# Patient Record
Sex: MALE | Race: BLACK OR AFRICAN AMERICAN | Employment: UNEMPLOYED | URBAN - METROPOLITAN AREA
[De-identification: names, ages, dates, MRNs, and addresses within clinical notes are randomized per-mention and may not be internally consistent; named-entity substitution may affect disease eponyms.]

---

## 2017-01-04 RX ORDER — TADALAFIL 20 MG/1
TABLET ORAL
Qty: 8 TABLET | Refills: 1 | Status: SHIPPED | OUTPATIENT
Start: 2017-01-04 | End: 2017-06-01 | Stop reason: SDUPTHER

## 2017-02-27 RX ORDER — IBUPROFEN 800 MG/1
TABLET ORAL
Qty: 90 TABLET | Refills: 3 | Status: SHIPPED | OUTPATIENT
Start: 2017-02-27 | End: 2017-08-17 | Stop reason: SDUPTHER

## 2017-06-02 RX ORDER — TADALAFIL 20 MG/1
TABLET ORAL
Qty: 8 TABLET | Refills: 1 | Status: SHIPPED | OUTPATIENT
Start: 2017-06-02 | End: 2017-07-26 | Stop reason: SDUPTHER

## 2017-07-26 RX ORDER — TADALAFIL 20 MG/1
TABLET ORAL
Qty: 8 TABLET | Refills: 1 | Status: SHIPPED | OUTPATIENT
Start: 2017-07-26 | End: 2017-08-17 | Stop reason: SDUPTHER

## 2017-08-17 ENCOUNTER — OFFICE VISIT (OUTPATIENT)
Dept: INTERNAL MEDICINE | Age: 42
End: 2017-08-17

## 2017-08-17 VITALS
WEIGHT: 185 LBS | HEIGHT: 72 IN | OXYGEN SATURATION: 99 % | BODY MASS INDEX: 25.06 KG/M2 | HEART RATE: 57 BPM | SYSTOLIC BLOOD PRESSURE: 147 MMHG | DIASTOLIC BLOOD PRESSURE: 89 MMHG

## 2017-08-17 DIAGNOSIS — Z12.5 PROSTATE CANCER SCREENING: ICD-10-CM

## 2017-08-17 DIAGNOSIS — Z13.1 ENCOUNTER FOR SCREENING FOR DIABETES MELLITUS: ICD-10-CM

## 2017-08-17 DIAGNOSIS — Z23 FLU VACCINE NEED: Primary | ICD-10-CM

## 2017-08-17 DIAGNOSIS — M51.9 LUMBAR DISC DISEASE: ICD-10-CM

## 2017-08-17 DIAGNOSIS — Z11.4 SCREENING FOR HIV WITHOUT PRESENCE OF RISK FACTORS: ICD-10-CM

## 2017-08-17 DIAGNOSIS — Z00.00 WELL ADULT EXAM: ICD-10-CM

## 2017-08-17 PROCEDURE — 90688 IIV4 VACCINE SPLT 0.5 ML IM: CPT | Performed by: INTERNAL MEDICINE

## 2017-08-17 PROCEDURE — 90471 IMMUNIZATION ADMIN: CPT | Performed by: INTERNAL MEDICINE

## 2017-08-17 PROCEDURE — 99214 OFFICE O/P EST MOD 30 MIN: CPT | Performed by: INTERNAL MEDICINE

## 2017-08-17 RX ORDER — TRAMADOL HYDROCHLORIDE 50 MG/1
50 TABLET ORAL EVERY 6 HOURS PRN
COMMUNITY

## 2017-08-17 RX ORDER — TADALAFIL 20 MG/1
TABLET ORAL
Qty: 8 TABLET | Refills: 1 | Status: SHIPPED | OUTPATIENT
Start: 2017-08-17 | End: 2018-02-03 | Stop reason: SDUPTHER

## 2017-08-17 RX ORDER — IBUPROFEN 800 MG/1
800 TABLET ORAL EVERY 8 HOURS PRN
Qty: 90 TABLET | Refills: 5 | Status: SHIPPED | OUTPATIENT
Start: 2017-08-17 | End: 2021-06-22

## 2017-08-17 RX ORDER — TRAMADOL HYDROCHLORIDE 50 MG/1
50 TABLET ORAL EVERY 6 HOURS PRN
Status: CANCELLED | OUTPATIENT
Start: 2017-08-17

## 2017-08-17 ASSESSMENT — ENCOUNTER SYMPTOMS
GASTROINTESTINAL NEGATIVE: 1
BACK PAIN: 1

## 2017-08-17 ASSESSMENT — PATIENT HEALTH QUESTIONNAIRE - PHQ9
SUM OF ALL RESPONSES TO PHQ QUESTIONS 1-9: 0
SUM OF ALL RESPONSES TO PHQ9 QUESTIONS 1 & 2: 0
2. FEELING DOWN, DEPRESSED OR HOPELESS: 0
1. LITTLE INTEREST OR PLEASURE IN DOING THINGS: 0

## 2018-02-05 RX ORDER — TADALAFIL 20 MG
TABLET ORAL
Qty: 8 TABLET | Refills: 2 | Status: SHIPPED | OUTPATIENT
Start: 2018-02-05 | End: 2018-12-13 | Stop reason: SDUPTHER

## 2018-09-26 PROBLEM — Z00.00 WELL ADULT EXAM: Status: RESOLVED | Noted: 2017-08-17 | Resolved: 2018-09-26

## 2018-12-13 ENCOUNTER — OFFICE VISIT (OUTPATIENT)
Dept: PRIMARY CARE CLINIC | Age: 43
End: 2018-12-13
Payer: COMMERCIAL

## 2018-12-13 VITALS
BODY MASS INDEX: 26.41 KG/M2 | TEMPERATURE: 97.9 F | HEART RATE: 55 BPM | DIASTOLIC BLOOD PRESSURE: 86 MMHG | HEIGHT: 72 IN | WEIGHT: 195 LBS | OXYGEN SATURATION: 100 % | SYSTOLIC BLOOD PRESSURE: 132 MMHG

## 2018-12-13 DIAGNOSIS — M54.9 ACUTE LEFT-SIDED BACK PAIN, UNSPECIFIED BACK LOCATION: Primary | ICD-10-CM

## 2018-12-13 PROCEDURE — 99213 OFFICE O/P EST LOW 20 MIN: CPT | Performed by: INTERNAL MEDICINE

## 2018-12-13 RX ORDER — MELOXICAM 15 MG/1
15 TABLET ORAL DAILY
Qty: 30 TABLET | Refills: 3 | Status: SHIPPED | OUTPATIENT
Start: 2018-12-13 | End: 2019-04-15 | Stop reason: SDUPTHER

## 2018-12-13 RX ORDER — TADALAFIL 20 MG/1
TABLET ORAL
Qty: 8 TABLET | Refills: 2 | Status: SHIPPED | OUTPATIENT
Start: 2018-12-13 | End: 2019-04-15 | Stop reason: SDUPTHER

## 2018-12-13 ASSESSMENT — PATIENT HEALTH QUESTIONNAIRE - PHQ9
SUM OF ALL RESPONSES TO PHQ QUESTIONS 1-9: 0
2. FEELING DOWN, DEPRESSED OR HOPELESS: 0
SUM OF ALL RESPONSES TO PHQ9 QUESTIONS 1 & 2: 0
SUM OF ALL RESPONSES TO PHQ QUESTIONS 1-9: 0
1. LITTLE INTEREST OR PLEASURE IN DOING THINGS: 0

## 2018-12-13 ASSESSMENT — ENCOUNTER SYMPTOMS: BACK PAIN: 1

## 2018-12-13 NOTE — PROGRESS NOTES
[facility]  [address]  [address]  Dept: [phone]  Dept Fax: [phone]    George Kenney is a 41 y.o. male who presents today for   Chief Complaint   Patient presents with    Back Pain     onset about 5 days ago. and follow upof chronic medical problems:   Patient Active Problem List   Diagnosis    Knee pain, left    Lumbar disc disease    Right shoulder pain    Neck pain on right side    Family planning   . Past Medical History:   Diagnosis Date    Arthritis     Caffeine use     1 tea, 2 soda/day    Chronic back pain     Erectile dysfunction        Past Surgical History:   Procedure Laterality Date    VASECTOMY Bilateral 12/21/2016       No family history on file. Social History   Substance Use Topics    Smoking status: Never Smoker    Smokeless tobacco: Never Used    Alcohol use Yes      Comment: 5 week      Current Outpatient Prescriptions   Medication Sig Dispense Refill    meloxicam (MOBIC) 15 MG tablet Take 1 tablet by mouth daily 30 tablet 3    tadalafil (CIALIS) 20 MG tablet TAKE ONE TABLET BY MOUTH DAILY AS NEEDED. 8 tablet 2    traMADol (ULTRAM) 50 MG tablet Take 50 mg by mouth every 6 hours as needed for Pain      ibuprofen (ADVIL;MOTRIN) 800 MG tablet Take 1 tablet by mouth every 8 hours as needed for Pain 90 tablet 5     No current facility-administered medications for this visit. No Known Allergies    Health Maintenance   Topic Date Due    HIV screen  06/15/1992    DTaP/Tdap/Td vaccine (1 - Tdap) 06/15/1996    Lipid screen  06/15/2017    Diabetes screen  06/15/2017    Flu vaccine (1) 09/01/2018       Controlled Substances Monitoring: Attestation: The Prescription Monitoring Report for this patient was reviewed today. Mary Lundy MD)    HPI:     Back Pain   This is a recurrent (states was thrown down by the police on Saturday) problem.  The current episode started in the past 7 days. The pain is present in the lumbar spine and thoracic spine (left side). The pain is moderate. The symptoms are aggravated by bending. He has tried analgesics and NSAIDs for the symptoms. Receives tramadol form the [facility]. ROS:     Review of Systems   Musculoskeletal: Positive for back pain. All other systems reviewed and are negative. Objective:     Physical Exam   Constitutional: He is oriented to person, place, and time. He appears well-developed and well-nourished. HENT:   Head: Normocephalic and atraumatic. Neck: Neck supple. Musculoskeletal:        Lumbar back: He exhibits tenderness. Decreased range of motion: lateral paraspinals from thoracic to lumbar. Neurological: He is alert and oriented to person, place, and time. Skin: Skin is warm and dry. Psychiatric: He has a normal mood and affect. His behavior is normal.   Vitals reviewed. Visit Information    Have you changed or started any medications since your last visit including any over-the-counter medicines, vitamins, or herbal medicines? Yes   Are you having any side effects from any of your medications? -  no  Have you stopped taking any of your medications? Is so, why? -  no    Have you seen any other physician or provider since your last visit? Yes - Radha Mitchell dr.  Have you had any other diagnostic tests since your last visit? No  Have you been seen in the emergency room and/or had an admission to a hospital since we last saw you? No  Have you had your routine dental cleaning in the past 6 months? no    Have you activated your [employer] account? If not, what are your barriers?  Yes     Patient Care Team:  Feliberto Stanton MD as PCP - General (Internal Medicine)  Jenni Kothari MD as Consulting Physician (Urology)    Medical History Review  Past Medical, Family, and Social History reviewed and does not contribute to the patient presenting condition    Health Maintenance   Topic Date Due    HIV

## 2019-04-15 RX ORDER — MELOXICAM 15 MG/1
TABLET ORAL
Qty: 30 TABLET | Refills: 3 | Status: SHIPPED | OUTPATIENT
Start: 2019-04-15 | End: 2019-08-01 | Stop reason: SDUPTHER

## 2019-04-15 RX ORDER — TADALAFIL 20 MG/1
TABLET ORAL
Qty: 8 TABLET | Refills: 2 | Status: SHIPPED | OUTPATIENT
Start: 2019-04-15 | End: 2019-08-01 | Stop reason: SDUPTHER

## 2019-04-15 NOTE — TELEPHONE ENCOUNTER
Last visit: 12/13/2018  Last Med refill: 03/15/2019  Does patient have enough medication for 72 hours: Yes    Next Visit Date:  Future Appointments   Date Time Provider Marie Rosen   4/25/2019 10:30 AM ABBY Whitt CNP [facility] [address] Maintenance   Topic Date Due    HIV screen  06/15/1992    DTaP/Tdap/Td vaccine (1 - Tdap) 06/15/1996    Lipid screen  06/15/2017    Diabetes screen  06/15/2017    Flu vaccine (Season Ended) 09/01/2019    Pneumococcal 0-64 years Vaccine  Aged Out       No results found for: LABA1C          ( goal A1C is < 7)   No results found for: LABMICR  No results found for: LDLCHOLESTEROL, LDLCALC    (goal LDL is <100)   No results found for: AST, ALT, BUN  BP Readings from Last 3 Encounters:   12/13/18 132/86   08/17/17 (!) 147/89   10/12/16 152/83          (goal 120/80)    All Future Testing planned in [facility]  Lab Frequency Next Occurrence   XR THORACIC SPINE (3 VIEWS) Once 12/13/2018               Patient Active Problem List:     Knee pain, left     Lumbar disc disease     Right shoulder pain     Neck pain on right side     Family planning

## 2019-04-24 ENCOUNTER — TELEPHONE (OUTPATIENT)
Dept: PRIMARY CARE CLINIC | Age: 44
End: 2019-04-24

## 2019-04-24 NOTE — TELEPHONE ENCOUNTER
Pt called stating he would like an order to be placed to test his testosterone levels. He has an appt with you 4/25/19 and would like to discuss the results there.

## 2019-04-25 ENCOUNTER — OFFICE VISIT (OUTPATIENT)
Dept: PRIMARY CARE CLINIC | Age: 44
End: 2019-04-25
Payer: COMMERCIAL

## 2019-04-25 VITALS
SYSTOLIC BLOOD PRESSURE: 131 MMHG | OXYGEN SATURATION: 99 % | WEIGHT: 195 LBS | TEMPERATURE: 97.3 F | DIASTOLIC BLOOD PRESSURE: 83 MMHG | BODY MASS INDEX: 26.45 KG/M2 | HEART RATE: 61 BPM

## 2019-04-25 DIAGNOSIS — Z13.220 SCREENING FOR HYPERLIPIDEMIA: ICD-10-CM

## 2019-04-25 DIAGNOSIS — M79.2 RADICULAR PAIN OF LEFT UPPER EXTREMITY: ICD-10-CM

## 2019-04-25 DIAGNOSIS — H61.21 RIGHT EAR IMPACTED CERUMEN: ICD-10-CM

## 2019-04-25 DIAGNOSIS — R53.83 FATIGUE, UNSPECIFIED TYPE: Primary | ICD-10-CM

## 2019-04-25 DIAGNOSIS — M54.10 RADICULAR PAIN OF LEFT LOWER EXTREMITY: ICD-10-CM

## 2019-04-25 DIAGNOSIS — Z13.1 ENCOUNTER FOR SCREENING FOR DIABETES MELLITUS: ICD-10-CM

## 2019-04-25 PROCEDURE — 99214 OFFICE O/P EST MOD 30 MIN: CPT | Performed by: NURSE PRACTITIONER

## 2019-04-25 ASSESSMENT — ENCOUNTER SYMPTOMS
DIARRHEA: 0
VOMITING: 0
TROUBLE SWALLOWING: 0
WHEEZING: 0
SHORTNESS OF BREATH: 0
ABDOMINAL PAIN: 0
BLOOD IN STOOL: 0

## 2019-04-25 ASSESSMENT — PATIENT HEALTH QUESTIONNAIRE - PHQ9
1. LITTLE INTEREST OR PLEASURE IN DOING THINGS: 0
SUM OF ALL RESPONSES TO PHQ QUESTIONS 1-9: 0
SUM OF ALL RESPONSES TO PHQ QUESTIONS 1-9: 0
SUM OF ALL RESPONSES TO PHQ9 QUESTIONS 1 & 2: 0
2. FEELING DOWN, DEPRESSED OR HOPELESS: 0

## 2019-04-25 NOTE — PROGRESS NOTES
[facility]  [address] [place] [address]  Dept: [phone]  Dept Fax: [phone]    2019     Amy Cardona (:  )PAULETTE a 41 y.o. male, here for evaluation of the following medical concerns:   Chief Complaint   Patient presents with    Blood Work     pt would like to have some blood work done. 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; just awake  Can sleep for 2-3 hours, then can just be up  Trouble getting up an going in the morning  In his mind he wants to do things, but can't get his body to go      Is having upper and lower back pain, numbness and weakness  He is seen by the [facility]  He's been told his low back is related to his service injury when he was 21  However, his neck issue is not related to the service  He thinks there is narrowing of C1-C4; unsure  Had MRI 2-3 years through the [facility], cervical and lumbar spine  Gets numbness in his left arm and also his left leg  Would like a second opinion  Tried gabapentin and did not help, just made him drowsy  Also tried Duloxetine, nortiptyline  Now takes tramadol at bedtime through the [facility]  Currently in PT  Just prescribed a TENS unit            . Review of Systems   Constitutional: Negative for appetite change, fever and unexpected weight change. HENT: Negative for hearing loss and trouble swallowing. Eyes: Negative for visual disturbance. Respiratory: Negative for shortness of breath and wheezing. Cardiovascular: Negative for chest pain and palpitations. Gastrointestinal: Negative for abdominal pain, blood in stool, diarrhea and vomiting. Endocrine: Negative for polydipsia and polyuria.    Genitourinary: Negative for frequency and hematuria. Musculoskeletal: Negative for myalgias and neck pain. Neurological: Positive for weakness (LUE) and numbness. Negative for seizures and headaches. Psychiatric/Behavioral: Negative for suicidal ideas. The patient is not nervous/anxious. Prior to Visit Medications    Medication Sig Taking? Authorizing Provider   meloxicam (MOBIC) 15 MG tablet TAKE ONE TABLET BY MOUTH ONCE DAILY Yes ABBY Roberson CNP   tadalafil (CIALIS) 20 MG tablet TAKE ONE TABLET BY MOUTH DAILY AS NEEDED Yes ABBY Roberson CNP   traMADol (ULTRAM) 50 MG tablet Take 50 mg by mouth every 6 hours as needed for Pain Yes Historical Provider, MD   ibuprofen (ADVIL;MOTRIN) 800 MG tablet Take 1 tablet by mouth every 8 hours as needed for Pain Yes Wilmar Negrete MD        Social History     Tobacco Use    Smoking status: Never Smoker    Smokeless tobacco: Never Used   Substance Use Topics    Alcohol use: Yes     Comment: 5 week        Vitals:    04/25/19 1025   BP: 131/83   Site: Left Upper Arm   Position: Sitting   Cuff Size: Large Adult   Pulse: 61   Temp: 97.3 °F (36.3 °C)   TempSrc: Oral   SpO2: 99%   Weight: 195 lb (88.5 kg)     Estimated body mass index is 26.45 kg/m² as calculated from the following:    Height as of 12/13/18: 6' (1.829 m). Weight as of this encounter: 195 lb (88.5 kg). DIAGNOSTIC FINDINGS:  CBC:No results found for: WBC, HGB, PLT    BMP:  No results found for: NA, K, CL, CO2, BUN, CREATININE, GLUCOSE    HEMOGLOBIN A1C: No results found for: LABA1C    FASTING LIPID PANEL:No results found for: CHOL, HDL, TRIG    Physical Exam   Constitutional: He is oriented to person, place, and time. He appears well-developed and well-nourished. He is cooperative. No distress. HENT:   Head: Normocephalic. Eyes: Pupils are equal, round, and reactive to light. No scleral icterus. Neck: Normal range of motion. Neck supple. Cardiovascular: Normal rate and regular rhythm. Pulmonary/Chest: Effort normal and breath sounds normal.   Abdominal: Soft. Musculoskeletal: He exhibits no edema. 4/5 strength in LUE with  strength   Neurological: He is alert and oriented to person, place, and time. Coordination normal.   Skin: Skin is warm and dry. Psychiatric: His speech is normal and behavior is normal. Judgment and thought content normal. His mood appears anxious. His affect is not inappropriate. Nursing note and vitals reviewed. ASSESSMENT     Diagnosis Orders   1. Fatigue, unspecified type  TSH With Reflex Ft4    CBC Auto Differential    Testosterone, Free   2. Screening for hyperlipidemia  Lipid, Fasting   3. Encounter for screening for diabetes mellitus  Comprehensive Metabolic Panel   4. Radicular pain of left lower extremity  MRI LUMBAR SPINE [address], Neurology, [place]   5. Radicular pain of left upper extremity  MRI CERVICAL SPINE [address], Neurology, [place]   6. Right ear impacted cerumen            PLAN:  No orders of the defined types were placed in this encounter. 1. Discussed possible causes of his fatigue. I agreed to check labs today. DDx of depression, although patient denies depressive thoughts. 2. Kelly Ruiz is asking for a second opinion regarding his neck and lower back symptoms. With the numbness and tingling, along with the weakness in his RUE an PRATEEK would be appropriate. He is currently under the care of PT. He has also tried conservative measures for his pain such as gabapentin, TCA's and Cymbalta. I will refer to neurology at his request at this time, possible neurosurgery depending on MRI results    FOLLOW UP AND INSTRUCTIONS:  Return in about 3 weeks (around 5/16/2019).       · Patient given educational materials - see patient instructions      · Patient advised to contact scheduling offices for any referrals or imaging orders placed today if they have not been

## 2019-04-26 ENCOUNTER — HOSPITAL ENCOUNTER (OUTPATIENT)
Age: 44
Discharge: HOME | End: 2019-04-26
Payer: COMMERCIAL

## 2019-04-26 DIAGNOSIS — Z13.1 ENCOUNTER FOR SCREENING FOR DIABETES MELLITUS: ICD-10-CM

## 2019-04-26 DIAGNOSIS — R53.83 FATIGUE, UNSPECIFIED TYPE: ICD-10-CM

## 2019-04-26 DIAGNOSIS — Z13.220 SCREENING FOR HYPERLIPIDEMIA: ICD-10-CM

## 2019-04-26 LAB
ABSOLUTE EOS #: 0.04 K/UL (ref 0–0.44)
ABSOLUTE IMMATURE GRANULOCYTE: <0.03 K/UL (ref 0–0.3)
ABSOLUTE LYMPH #: 2.73 K/UL (ref 1.1–3.7)
ABSOLUTE MONO #: 0.52 K/UL (ref 0.1–1.2)
ALBUMIN SERPL-MCNC: 4.8 G/DL (ref 3.5–5.2)
ALBUMIN/GLOBULIN RATIO: 2 (ref 1–2.5)
ALP BLD-CCNC: 50 U/L (ref 40–129)
ALT SERPL-CCNC: 16 U/L (ref 5–41)
ANION GAP SERPL CALCULATED.3IONS-SCNC: 11 MMOL/L (ref 9–17)
AST SERPL-CCNC: 48 U/L
BASOPHILS # BLD: 1 % (ref 0–2)
BASOPHILS ABSOLUTE: 0.06 K/UL (ref 0–0.2)
BILIRUB SERPL-MCNC: 0.32 MG/DL (ref 0.3–1.2)
BUN BLDV-MCNC: 26 MG/DL (ref 6–20)
BUN/CREAT BLD: ABNORMAL (ref 9–20)
CALCIUM SERPL-MCNC: 9.4 MG/DL (ref 8.6–10.4)
CHLORIDE BLD-SCNC: 103 MMOL/L (ref 98–107)
CHOLESTEROL, FASTING: 194 MG/DL
CHOLESTEROL/HDL RATIO: 2.3
CO2: 26 MMOL/L (ref 20–31)
CREAT SERPL-MCNC: 1.34 MG/DL (ref 0.7–1.2)
DIFFERENTIAL TYPE: ABNORMAL
EOSINOPHILS RELATIVE PERCENT: 1 % (ref 1–4)
GFR AFRICAN AMERICAN: >60 ML/MIN
GFR NON-AFRICAN AMERICAN: 59 ML/MIN
GFR SERPL CREATININE-BSD FRML MDRD: ABNORMAL ML/MIN/{1.73_M2}
GFR SERPL CREATININE-BSD FRML MDRD: ABNORMAL ML/MIN/{1.73_M2}
GLUCOSE BLD-MCNC: 91 MG/DL (ref 70–99)
HCT VFR BLD CALC: 42.1 % (ref 40.7–50.3)
HDLC SERPL-MCNC: 84 MG/DL
HEMOGLOBIN: 13.6 G/DL (ref 13–17)
IMMATURE GRANULOCYTES: 0 %
LDL CHOLESTEROL: 98 MG/DL (ref 0–130)
LYMPHOCYTES # BLD: 44 % (ref 24–43)
MCH RBC QN AUTO: 29.8 PG (ref 25.2–33.5)
MCHC RBC AUTO-ENTMCNC: 32.3 G/DL (ref 28.4–34.8)
MCV RBC AUTO: 92.3 FL (ref 82.6–102.9)
MONOCYTES # BLD: 8 % (ref 3–12)
NRBC AUTOMATED: 0 PER 100 WBC
PDW BLD-RTO: 13.2 % (ref 11.8–14.4)
PLATELET # BLD: 245 K/UL (ref 138–453)
PLATELET ESTIMATE: ABNORMAL
PMV BLD AUTO: 11.1 FL (ref 8.1–13.5)
POTASSIUM SERPL-SCNC: 4.3 MMOL/L (ref 3.7–5.3)
RBC # BLD: 4.56 M/UL (ref 4.21–5.77)
RBC # BLD: ABNORMAL 10*6/UL
SEG NEUTROPHILS: 46 % (ref 36–65)
SEGMENTED NEUTROPHILS ABSOLUTE COUNT: 2.84 K/UL (ref 1.5–8.1)
SEX HORMONE BINDING GLOBULIN: 38 NMOL/L (ref 11–80)
SODIUM BLD-SCNC: 140 MMOL/L (ref 135–144)
TESTOSTERONE FREE-NONMALE: 94.5 PG/ML (ref 47–244)
TESTOSTERONE TOTAL: 492 NG/DL (ref 220–1000)
TOTAL PROTEIN: 7.2 G/DL (ref 6.4–8.3)
TRIGLYCERIDE, FASTING: 60 MG/DL
TSH SERPL DL<=0.05 MIU/L-ACNC: 2.62 MIU/L (ref 0.3–5)
VLDLC SERPL CALC-MCNC: NORMAL MG/DL (ref 1–30)
WBC # BLD: 6.2 K/UL (ref 3.5–11.3)
WBC # BLD: ABNORMAL 10*3/UL

## 2019-04-26 PROCEDURE — 85025 COMPLETE CBC W/AUTO DIFF WBC: CPT

## 2019-04-26 PROCEDURE — 80053 COMPREHEN METABOLIC PANEL: CPT

## 2019-04-26 PROCEDURE — 80061 LIPID PANEL: CPT

## 2019-04-26 PROCEDURE — 36415 COLL VENOUS BLD VENIPUNCTURE: CPT

## 2019-04-26 PROCEDURE — 84270 ASSAY OF SEX HORMONE GLOBUL: CPT

## 2019-04-26 PROCEDURE — 84403 ASSAY OF TOTAL TESTOSTERONE: CPT

## 2019-04-26 PROCEDURE — 84443 ASSAY THYROID STIM HORMONE: CPT

## 2019-04-29 ENCOUNTER — TELEPHONE (OUTPATIENT)
Dept: PRIMARY CARE CLINIC | Age: 44
End: 2019-04-29

## 2019-04-29 DIAGNOSIS — N28.9 ABNORMAL KIDNEY FUNCTION: Primary | ICD-10-CM

## 2019-04-29 NOTE — TELEPHONE ENCOUNTER
Let Karan Fink know all his labs are normal except his kidney levels, which are high. I want him to have them rechecked in 2 week, and to make sure he is well hydrated when he does so.

## 2019-08-01 RX ORDER — MELOXICAM 15 MG/1
15 TABLET ORAL DAILY
Qty: 30 TABLET | Refills: 2 | Status: SHIPPED | OUTPATIENT
Start: 2019-08-01 | End: 2019-11-19 | Stop reason: SDUPTHER

## 2019-08-01 RX ORDER — TADALAFIL 20 MG/1
TABLET ORAL
Qty: 8 TABLET | Refills: 2 | Status: SHIPPED | OUTPATIENT
Start: 2019-08-01 | End: 2019-11-19 | Stop reason: SDUPTHER

## 2019-12-16 RX ORDER — TADALAFIL 20 MG/1
TABLET ORAL
Qty: 8 TABLET | Refills: 2 | Status: SHIPPED | OUTPATIENT
Start: 2019-12-16 | End: 2020-02-09

## 2020-02-09 ENCOUNTER — HOSPITAL ENCOUNTER (EMERGENCY)
Age: 45
Discharge: HOME | End: 2020-02-09
Attending: EMERGENCY MEDICINE
Payer: COMMERCIAL

## 2020-02-09 VITALS
HEIGHT: 72 IN | OXYGEN SATURATION: 96 % | HEART RATE: 102 BPM | RESPIRATION RATE: 18 BRPM | DIASTOLIC BLOOD PRESSURE: 88 MMHG | TEMPERATURE: 97.9 F | SYSTOLIC BLOOD PRESSURE: 122 MMHG | BODY MASS INDEX: 25.87 KG/M2 | WEIGHT: 191 LBS

## 2020-02-09 PROCEDURE — 2500000003 HC RX 250 WO HCPCS: Performed by: PHYSICIAN ASSISTANT

## 2020-02-09 PROCEDURE — 6370000000 HC RX 637 (ALT 250 FOR IP): Performed by: PHYSICIAN ASSISTANT

## 2020-02-09 PROCEDURE — 12011 RPR F/E/E/N/L/M 2.5 CM/<: CPT

## 2020-02-09 PROCEDURE — 99282 EMERGENCY DEPT VISIT SF MDM: CPT

## 2020-02-09 RX ORDER — CEPHALEXIN 250 MG/1
500 CAPSULE ORAL ONCE
Status: COMPLETED | OUTPATIENT
Start: 2020-02-09 | End: 2020-02-09

## 2020-02-09 RX ORDER — LIDOCAINE HYDROCHLORIDE AND EPINEPHRINE 10; 10 MG/ML; UG/ML
20 INJECTION, SOLUTION INFILTRATION; PERINEURAL ONCE
Status: COMPLETED | OUTPATIENT
Start: 2020-02-09 | End: 2020-02-09

## 2020-02-09 RX ORDER — CEPHALEXIN 500 MG/1
500 CAPSULE ORAL 4 TIMES DAILY
Qty: 20 CAPSULE | Refills: 0 | Status: SHIPPED | OUTPATIENT
Start: 2020-02-09 | End: 2020-02-14

## 2020-02-09 RX ADMIN — CEPHALEXIN 500 MG: 250 CAPSULE ORAL at 20:42

## 2020-02-09 RX ADMIN — LIDOCAINE HYDROCHLORIDE AND EPINEPHRINE 20 ML: 10; 10 INJECTION, SOLUTION INFILTRATION; PERINEURAL at 20:18

## 2020-02-09 ASSESSMENT — PAIN SCALES - GENERAL
PAINLEVEL_OUTOF10: 3
PAINLEVEL_OUTOF10: 3

## 2020-02-09 ASSESSMENT — PAIN DESCRIPTION - ORIENTATION: ORIENTATION: UPPER

## 2020-02-09 ASSESSMENT — PAIN DESCRIPTION - LOCATION: LOCATION: OTHER (COMMENT)

## 2020-02-10 ENCOUNTER — TELEPHONE (OUTPATIENT)
Dept: PRIMARY CARE CLINIC | Age: 45
End: 2020-02-10

## 2020-02-10 NOTE — ED PROVIDER NOTES
[address] [facility]  [address] [facility] [address]  Phone: [phone]  Fax: [phone]      Pt Name: Blake Tabares  MRN: [id number]  [place] 1975  Date of evaluation: 2/9/2020      CHIEF COMPLAINT       Chief Complaint   Patient presents with    Lip Laceration     happened at 1926, playing basketball and was elbowed i the upper lip       HISTORY OF PRESENT ILLNESS   (Location, Quality, Severity, Duration, Timing, Context, ModifyingFactors, Associated Signs and Symptoms)     Blake Tabares is a 45 y.o. male who presents to the ER for evaluation of a upper lip laceration. Patient states that he was playing basketball when he was elbowed by another player. This injury occurred around 7:30 PM this evening. Patient had no loss of consciousness. He states that there is no injury to  his teeth. Patient's tetanus is up-to-date. Patient is not anticoagulated. He is not diabetic. He rates his acute upper lip pain at 3/10. Nursing Notes were reviewed. REVIEW OF SYSTEMS     (2-9 systems for level 4, 10 or more for level 5)    Review of Systems   Constitutional: Negative for chills and fever. HENT: Negative for congestion, ear pain and sore throat. Eyes: Negative for discharge and redness. Respiratory: Negative for cough and shortness of breath. Cardiovascular: Negative for chest pain. Gastrointestinal: Negative for abdominal pain, nausea and vomiting. Genitourinary: Negative for decreased urine volume. Musculoskeletal: Negative for back pain and neck pain. Skin:        Lip laceration. Neurological: Negative for seizures and syncope. All other systems reviewed and are negative. PAST MEDICAL HISTORY    has a past medical history of Arthritis, Caffeine use, Chronic back pain, and Erectile dysfunction. SURGICAL HISTORY      has a past surgical history that includes Vasectomy (Bilateral, 12/21/2016).     CURRENT MEDICATIONS       Previous 02/09/20 1948   BP: 122/88   Pulse: 102   Resp: 18   Temp: 97.9 °F (36.6 °C)   TempSrc: Oral   SpO2: 96%   Weight: 86.6 kg (191 lb)   Height: 6' (1.829 m)     -------------------------  BP: 122/88, Temp: 97.9 °F (36.6 °C), Pulse: 102, Resp: 18    The patient was given the following medications:  Orders Placed This Encounter   Medications    lidocaine-EPINEPHrine 1 percent-[id number] injection 20 mL    cephALEXin (KEFLEX) capsule 500 mg    cephALEXin (KEFLEX) 500 MG capsule     Sig: Take 1 capsule by mouth 4 times daily for 5 days     Dispense:  20 capsule     Refill:  0      PROCEDURES  Laceration Repair:  Verbal consent was obtained from the patient. Laceration description (see physical examination). The laceration was prepped with Betadine and draped in sterile fashion. Anesthesia was achieved with 2.5 cc of 1% lidocaine with epinephrine. The wound was explored. No foreign bodies detected. The wound edges were reapproximated using 7 simple interrupted 5-0 Vicryl sutures. The patient tolerated the procedure well. No complications. Wound care instructions were discussed with the patient. FINAL IMPRESSION      1.  Lip laceration, initial encounter        DISPOSITION/PLAN   DISPOSITION Decision To Discharge- home 02/09/2020 08:41:02 PM    Condition on Disposition  Stable    PATIENT REFERRED TO:  ABBY Smith CNP  [address]  [address]  [phone]    Schedule an appointment as soon as possible for a visit   For wound re-check in 2-3 days    DISCHARGE MEDICATIONS:  New Prescriptions    CEPHALEXIN (KEFLEX) 500 MG CAPSULE    Take 1 capsule by mouth 4 times daily for 5 days     (Please note that portions of this note were completed with a voice recognition program.  Efforts were made to edit the dictations but occasionally words are mis-transcribed.)    Brijesh Frank PA-C  02/12/20 1123

## 2020-02-12 ASSESSMENT — ENCOUNTER SYMPTOMS
SORE THROAT: 0
ABDOMINAL PAIN: 0
BACK PAIN: 0
EYE DISCHARGE: 0
VOMITING: 0
COUGH: 0
EYE REDNESS: 0
SHORTNESS OF BREATH: 0
NAUSEA: 0

## 2020-02-18 ENCOUNTER — OFFICE VISIT (OUTPATIENT)
Dept: PRIMARY CARE CLINIC | Age: 45
End: 2020-02-18
Payer: COMMERCIAL

## 2020-02-18 VITALS
TEMPERATURE: 97.5 F | BODY MASS INDEX: 26.04 KG/M2 | WEIGHT: 192 LBS | DIASTOLIC BLOOD PRESSURE: 80 MMHG | OXYGEN SATURATION: 98 % | SYSTOLIC BLOOD PRESSURE: 122 MMHG | HEART RATE: 61 BPM

## 2020-02-18 PROCEDURE — 99214 OFFICE O/P EST MOD 30 MIN: CPT | Performed by: NURSE PRACTITIONER

## 2020-02-18 ASSESSMENT — ENCOUNTER SYMPTOMS
ABDOMINAL PAIN: 0
BLOOD IN STOOL: 0
TROUBLE SWALLOWING: 0
VOMITING: 0
DIARRHEA: 0
SHORTNESS OF BREATH: 0
WHEEZING: 0

## 2020-02-18 NOTE — PROGRESS NOTES
[facility]  [facility] [place] [address]  Dept: [phone]  Dept Fax: [phone]    Patient Care Team:  ABBY Rosa CNP as PCP - General (Family Medicine)  ABBY Rosa CNP as PCP - [facility] Empaneled Provider  Lorenzo Collins MD as Consulting Physician (Urology)    2020     Rylee Garciain (:  )MP a 45 y.o. male, here for evaluation of the following medical concerns:   Chief Complaint   Patient presents with    Check-Up    Ankle Pain     pt states that he has been having having pain in both ankles        Angel Levi is here today for his annual follow-up and exam.  He tells me on the  he went to the [facility] emergency room after he was elbowed in the face during a basketball game. His lip required stitches, Angel Levi reports he return to the ER to have them removed. He denies any major changes from his last appointment. He would like some routine lab tests  Father had prostate cancer, he is not sure if he has had any previous prostate cancer screening. Works out, [employer]    Denies a good sleep pattern, 6 interrupted hours. 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 He thinks there is narrowing of C1-C4; unsure  Had MRI 3-4 years through the  [facility], cervical and lumbar spine  Gets numbness in his left arm and also his left leg  Tried gabapentin and did not help, just made him drowsy  Also tried Duloxetine, nortiptyline  Now takes tramadol at bedtime through the  [facility], he uses it sparingly and only on bad days. Otherwise he feels pain is controlled with ibuprofen. Currently in PT.  He feels his left shoulder is often stiff in the morning and range of motion is limited, however this improves as the day goes on. The numbness and tingling does not improve  Just prescribed a TENS unit    Notices weakness in hands, trouble opening jars. Occurs both hands, left is worse          . Review of Systems   Constitutional: Negative for appetite change, fever and unexpected weight change. HENT: Negative for hearing loss and trouble swallowing. Eyes: Negative for visual disturbance. Respiratory: Negative for shortness of breath and wheezing. Cardiovascular: Negative for chest pain and palpitations. Gastrointestinal: Negative for abdominal pain, blood in stool, diarrhea and vomiting. Endocrine: Negative for polydipsia and polyuria. Genitourinary: Negative for frequency and hematuria. Musculoskeletal: Negative for myalgias and neck pain. Neurological: Positive for weakness (LUE) and numbness. Negative for seizures and headaches. Psychiatric/Behavioral: Negative for suicidal ideas. The patient is not nervous/anxious. Prior to Visit Medications    Medication Sig Taking?  Authorizing Provider   tadalafil (CIALIS) 20 MG tablet TAKE ONE TABLET BY MOUTH EVERY DAY AS NEEDED Yes Arlin Hill APRN - CNP   traMADol (ULTRAM) 50 MG tablet Take 50 mg by mouth every 6 hours as needed for Pain Yes Historical Provider, MD   ibuprofen (ADVIL;MOTRIN) 800 MG tablet Take 1 tablet by mouth every 8 hours as needed for Pain Yes Jennifer Kwon MD        Social History     Tobacco Use    Smoking status: Never Smoker    Smokeless tobacco: Never Used   Substance Use Topics    Alcohol use: Yes     Comment: 5 week        Vitals:    02/18/20 0819   BP: 122/80   Site: Left Upper Arm   Position: Sitting   Cuff Size: Large Adult   Pulse: 61   Temp: 97.5 °F (36.4 °C)   TempSrc: Oral   SpO2: 98%   Weight: 192 lb (87.1 kg)     Estimated body mass index is 26.04 kg/m² as calculated from the following:    Height as of 2/9/20: 6' (1.829 m). Weight as of this encounter: 192 lb (87.1 kg). DIAGNOSTIC FINDINGS:  CBC:  Lab Results   Component Value Date    WBC 6.2 04/26/2019    HGB 13.6 04/26/2019     04/26/2019       BMP:    Lab Results   Component Value Date     04/26/2019    K 4.3 04/26/2019     04/26/2019    CO2 26 04/26/2019    BUN 26 04/26/2019    CREATININE 1.34 04/26/2019    GLUCOSE 91 04/26/2019   . HEMOGLOBIN A1C: No results found for: LABA1C    FASTING LIPID PANEL:  Lab Results   Component Value Date    HDL 84 04/26/2019       Physical Exam  Vitals signs and nursing note reviewed. Constitutional:       General: He is not in acute distress. Appearance: He is well-developed. HENT:      Head: Normocephalic. Right Ear: External ear normal.      Left Ear: External ear normal.      Mouth/Throat:      Mouth: Mucous membranes are moist.   Eyes:      General: No scleral icterus. Pupils: Pupils are equal, round, and reactive to light. Neck:      Musculoskeletal: Normal range of motion and neck supple. No muscular tenderness. Cardiovascular:      Rate and Rhythm: Normal rate and regular rhythm. Pulmonary:      Effort: Pulmonary effort is normal.      Breath sounds: Normal breath sounds. Abdominal:      Palpations: Abdomen is soft. Musculoskeletal:      Comments: 4/5 strength in LUE with  strength  Neg Tinnels sign   Lymphadenopathy:      Cervical: No cervical adenopathy. Skin:     General: Skin is warm and dry. Neurological:      Mental Status: He is alert and oriented to person, place, and time. Coordination: Coordination normal.   Psychiatric:         Mood and Affect: Mood is anxious. Affect is not inappropriate. Speech: Speech normal.         Behavior: Behavior normal. Behavior is cooperative. Thought Content: Thought content normal.         Judgment: Judgment normal.         ASSESSMENT     Diagnosis Orders   1.  Radicular pain of left upper extremity  Vitamin B12 & Folate   2. Lumbar disc disease     3. Screening for hyperlipidemia  Lipid, Fasting   4. Prostate cancer screening  PSA Screening          PLAN:  No orders of the defined types were placed in this encounter. 1. Screening labs ordered today. Angel Levi was advised to fast to screen for cholesterol and diabetes. Last April Benigno's creatinine was at 1.34 and BUN of 26. No hyperkalemia present with a GFR greater than 60. Dehydration was suspected however Angel Levi did not complete follow-up labs. We discussed the changes and he will have the labs checked. 2. Father with history of prostate cancer, discussed the risks and benefits of PSA testing. Patient would like to have the screening completed. 3. No changes in radicular symptoms per patient. Due to cost he could not complete the MRI last year, however he believes there is a payment plan available and he wishes to attempt the MRI. Orders reprinted and number for scheduling provided. 4. We will attempt to contact the [place] for previous  MRI results    FOLLOW UP AND INSTRUCTIONS:  No follow-ups on file. · Angel Levi received counseling on the following healthy behaviors:nutrition and exercise    · Discussed use, benefit, and side effects of prescribed medications. Barriers to  medication compliance addressed. All patient questions answered. Pt  verbalized understanding of all instructions given. · Patient given educational materials - see patient instructions      · Patient advised to contact scheduling offices for any referrals or imaging orders  placed today if they have not been contacted in 48 hours. No follow-ups on file. An electronic signature was used to authenticate this note. --ABBY Rosa - CNP on 2/18/2020 at 9:40 AM  Visit Information    Have you changed or started any medications since your last visit including any over-the-counter medicines, vitamins, or herbal medicines?  yes - vit D

## 2020-02-26 ENCOUNTER — HOSPITAL ENCOUNTER (OUTPATIENT)
Age: 45
Discharge: HOME | End: 2020-02-26
Payer: COMMERCIAL

## 2020-02-26 LAB
ALBUMIN SERPL-MCNC: 4.6 G/DL (ref 3.5–5.2)
ALBUMIN/GLOBULIN RATIO: 1.8 (ref 1–2.5)
ALP BLD-CCNC: 49 U/L (ref 40–129)
ALT SERPL-CCNC: 14 U/L (ref 5–41)
ANION GAP SERPL CALCULATED.3IONS-SCNC: 11 MMOL/L (ref 9–17)
AST SERPL-CCNC: 39 U/L
BILIRUB SERPL-MCNC: 0.57 MG/DL (ref 0.3–1.2)
BILIRUBIN DIRECT: 0.14 MG/DL
BILIRUBIN, INDIRECT: 0.43 MG/DL (ref 0–1)
BUN BLDV-MCNC: 18 MG/DL (ref 6–20)
BUN/CREAT BLD: NORMAL (ref 9–20)
CALCIUM SERPL-MCNC: 9.2 MG/DL (ref 8.6–10.4)
CHLORIDE BLD-SCNC: 105 MMOL/L (ref 98–107)
CHOLESTEROL, FASTING: 180 MG/DL
CHOLESTEROL/HDL RATIO: 2.7
CO2: 25 MMOL/L (ref 20–31)
CREAT SERPL-MCNC: 1.14 MG/DL (ref 0.7–1.2)
FOLATE: 9.1 NG/ML
GFR AFRICAN AMERICAN: >60 ML/MIN
GFR NON-AFRICAN AMERICAN: >60 ML/MIN
GFR SERPL CREATININE-BSD FRML MDRD: NORMAL ML/MIN/{1.73_M2}
GFR SERPL CREATININE-BSD FRML MDRD: NORMAL ML/MIN/{1.73_M2}
GLOBULIN: NORMAL G/DL (ref 1.5–3.8)
GLUCOSE BLD-MCNC: 92 MG/DL (ref 70–99)
HDLC SERPL-MCNC: 67 MG/DL
LDL CHOLESTEROL: 98 MG/DL (ref 0–130)
POTASSIUM SERPL-SCNC: 4.3 MMOL/L (ref 3.7–5.3)
PROSTATE SPECIFIC ANTIGEN: 0.44 UG/L
SODIUM BLD-SCNC: 141 MMOL/L (ref 135–144)
TOTAL PROTEIN: 7.1 G/DL (ref 6.4–8.3)
TRIGLYCERIDE, FASTING: 77 MG/DL
VITAMIN B-12: 1301 PG/ML (ref 232–1245)
VLDLC SERPL CALC-MCNC: NORMAL MG/DL (ref 1–30)

## 2020-02-26 PROCEDURE — 80048 BASIC METABOLIC PNL TOTAL CA: CPT

## 2020-02-26 PROCEDURE — 36415 COLL VENOUS BLD VENIPUNCTURE: CPT

## 2020-02-26 PROCEDURE — 82746 ASSAY OF FOLIC ACID SERUM: CPT

## 2020-02-26 PROCEDURE — G0103 PSA SCREENING: HCPCS

## 2020-02-26 PROCEDURE — 80061 LIPID PANEL: CPT

## 2020-02-26 PROCEDURE — 80076 HEPATIC FUNCTION PANEL: CPT

## 2020-02-26 PROCEDURE — 82607 VITAMIN B-12: CPT

## 2020-03-31 RX ORDER — TADALAFIL 20 MG/1
TABLET ORAL
Qty: 8 TABLET | Refills: 2 | Status: SHIPPED | OUTPATIENT
Start: 2020-03-31 | End: 2020-05-05

## 2020-03-31 NOTE — TELEPHONE ENCOUNTER
Health Maintenance   Topic Date Due    HIV screen  06/15/1992    DTaP/Tdap/Td vaccine (1 - Tdap) 06/15/1996    Flu vaccine (1) 09/01/2019    Lipid screen  02/26/2025    Hepatitis A vaccine  Aged Out    Hepatitis B vaccine  Aged Out    Hib vaccine  Aged Out    Meningococcal (ACWY) vaccine  Aged Out    Pneumococcal 0-64 years Vaccine  Aged Out             (applicable per patient's age: Cancer Screenings, Depression Screening, Fall Risk Screening, Immunizations)    LDL Cholesterol (mg/dL)   Date Value   02/26/2020 98     AST (U/L)   Date Value   02/26/2020 39     ALT (U/L)   Date Value   02/26/2020 14     BUN (mg/dL)   Date Value   02/26/2020 18      (goal A1C is < 7)   (goal LDL is <100) need 30-50% reduction from baseline     BP Readings from Last 3 Encounters:   02/18/20 122/80   02/09/20 122/88   04/25/19 131/83    (goal /80)      All Future Testing planned in [facility]:  Lab Frequency Next Occurrence   MRI CERVICAL SPINE WO CONTRAST Once 04/25/2020   MRI LUMBAR SPINE WO CONTRAST Once 04/25/2020       Next Visit Date:  No future appointments.          Patient Active Problem List:     Knee pain, left     Lumbar disc disease     Right shoulder pain     Neck pain on right side     Family planning

## 2020-04-24 RX ORDER — TADALAFIL 20 MG/1
TABLET ORAL
Qty: 8 TABLET | Refills: 2 | OUTPATIENT
Start: 2020-04-24

## 2020-04-28 ENCOUNTER — TELEPHONE (OUTPATIENT)
Dept: FAMILY MEDICINE CLINIC | Age: 45
End: 2020-04-28

## 2020-05-01 RX ORDER — TADALAFIL 20 MG/1
TABLET ORAL
Qty: 8 TABLET | Refills: 2 | OUTPATIENT
Start: 2020-05-01

## 2020-05-05 RX ORDER — TADALAFIL 20 MG/1
TABLET ORAL
Qty: 8 TABLET | Refills: 2 | Status: SHIPPED | OUTPATIENT
Start: 2020-05-05 | End: 2020-06-09

## 2020-05-05 NOTE — TELEPHONE ENCOUNTER
Health Maintenance   Topic Date Due    HIV screen  06/15/1992    DTaP/Tdap/Td vaccine (1 - Tdap) 06/15/1996    Flu vaccine (Season Ended) 09/01/2020    Lipid screen  02/26/2025    Hepatitis A vaccine  Aged Out    Hepatitis B vaccine  Aged Out    Hib vaccine  Aged Out    Meningococcal (ACWY) vaccine  Aged Out    Pneumococcal 0-64 years Vaccine  Aged Out             (applicable per patient's age: Cancer Screenings, Depression Screening, Fall Risk Screening, Immunizations)    LDL Cholesterol (mg/dL)   Date Value   02/26/2020 98     AST (U/L)   Date Value   02/26/2020 39     ALT (U/L)   Date Value   02/26/2020 14     BUN (mg/dL)   Date Value   02/26/2020 18      (goal A1C is < 7)   (goal LDL is <100) need 30-50% reduction from baseline     BP Readings from Last 3 Encounters:   02/18/20 122/80   02/09/20 122/88   04/25/19 131/83    (goal /80)      All Future Testing planned in [facility]:  Lab Frequency Next Occurrence       Next Visit Date:  No future appointments.          Patient Active Problem List:     Knee pain, left     Lumbar disc disease     Right shoulder pain     Neck pain on right side     Family planning

## 2020-06-09 ENCOUNTER — TELEPHONE (OUTPATIENT)
Dept: PRIMARY CARE CLINIC | Age: 45
End: 2020-06-09

## 2020-06-09 RX ORDER — TADALAFIL 20 MG/1
TABLET ORAL
Qty: 30 TABLET | Refills: 2 | Status: SHIPPED | OUTPATIENT
Start: 2020-06-09 | End: 2020-09-10

## 2020-09-10 RX ORDER — TADALAFIL 20 MG/1
TABLET ORAL
Qty: 30 TABLET | Refills: 2 | Status: SHIPPED | OUTPATIENT
Start: 2020-09-10 | End: 2021-01-13

## 2020-09-10 NOTE — TELEPHONE ENCOUNTER
Health Maintenance   Topic Date Due    HIV screen  06/15/1992    DTaP/Tdap/Td vaccine (1 - Tdap) 06/15/1996    Flu vaccine (1) 09/01/2020    Lipid screen  02/26/2025    Hepatitis A vaccine  Aged Out    Hepatitis B vaccine  Aged Out    Hib vaccine  Aged Out    Meningococcal (ACWY) vaccine  Aged Out    Pneumococcal 0-64 years Vaccine  Aged Out             (applicable per patient's age: Cancer Screenings, Depression Screening, Fall Risk Screening, Immunizations)    LDL Cholesterol (mg/dL)   Date Value   02/26/2020 98     AST (U/L)   Date Value   02/26/2020 39     ALT (U/L)   Date Value   02/26/2020 14     BUN (mg/dL)   Date Value   02/26/2020 18      (goal A1C is < 7)   (goal LDL is <100) need 30-50% reduction from baseline     BP Readings from Last 3 Encounters:   02/18/20 122/80   02/09/20 122/88   04/25/19 131/83    (goal /80)      All Future Testing planned in [facility]:  Lab Frequency Next Occurrence       Next Visit Date:  No future appointments.          Patient Active Problem List:     Knee pain, left     Lumbar disc disease     Right shoulder pain     Neck pain on right side     Family planning

## 2021-01-13 RX ORDER — TADALAFIL 20 MG/1
TABLET ORAL
Qty: 8 TABLET | Refills: 0 | Status: SHIPPED | OUTPATIENT
Start: 2021-01-13 | End: 2021-02-08

## 2021-01-13 NOTE — TELEPHONE ENCOUNTER
Health Maintenance   Topic Date Due    Hepatitis C screen  1975    HIV screen  06/15/1992    DTaP/Tdap/Td vaccine (1 - Tdap) 06/15/1996    Flu vaccine (1) 09/01/2020    Lipid screen  02/26/2025    Hepatitis A vaccine  Aged Out    Hepatitis B vaccine  Aged Out    Hib vaccine  Aged Out    Meningococcal (ACWY) vaccine  Aged Out    Pneumococcal 0-64 years Vaccine  Aged Out             (applicable per patient's age: Cancer Screenings, Depression Screening, Fall Risk Screening, Immunizations)    LDL Cholesterol (mg/dL)   Date Value   02/26/2020 98     AST (U/L)   Date Value   02/26/2020 39     ALT (U/L)   Date Value   02/26/2020 14     BUN (mg/dL)   Date Value   02/26/2020 18      (goal A1C is < 7)   (goal LDL is <100) need 30-50% reduction from baseline     BP Readings from Last 3 Encounters:   02/18/20 122/80   02/09/20 122/88   04/25/19 131/83    (goal /80)      All Future Testing planned in [facility]:  Lab Frequency Next Occurrence       Next Visit Date:  Future Appointments   Date Time Provider Marie Rosen   1/18/2021  9:45 AM ABBY Sanchez - [facility]            Patient Active Problem List:     Knee pain, left     Lumbar disc disease     Right shoulder pain     Neck pain on right side     Family planning

## 2021-01-18 ENCOUNTER — OFFICE VISIT (OUTPATIENT)
Dept: PRIMARY CARE CLINIC | Age: 46
End: 2021-01-18
Payer: COMMERCIAL

## 2021-01-18 VITALS
WEIGHT: 192 LBS | BODY MASS INDEX: 26.04 KG/M2 | SYSTOLIC BLOOD PRESSURE: 126 MMHG | HEART RATE: 74 BPM | TEMPERATURE: 98.4 F | DIASTOLIC BLOOD PRESSURE: 83 MMHG | OXYGEN SATURATION: 99 %

## 2021-01-18 DIAGNOSIS — Z13.220 LIPID SCREENING: ICD-10-CM

## 2021-01-18 DIAGNOSIS — M79.2 RADICULAR PAIN OF LEFT UPPER EXTREMITY: Primary | ICD-10-CM

## 2021-01-18 DIAGNOSIS — Z11.59 NEED FOR HEPATITIS C SCREENING TEST: ICD-10-CM

## 2021-01-18 DIAGNOSIS — M25.562 CHRONIC PAIN OF LEFT KNEE: ICD-10-CM

## 2021-01-18 DIAGNOSIS — M51.16 LUMBAR DISC HERNIATION WITH RADICULOPATHY: ICD-10-CM

## 2021-01-18 DIAGNOSIS — Z11.3 SCREEN FOR STD (SEXUALLY TRANSMITTED DISEASE): ICD-10-CM

## 2021-01-18 DIAGNOSIS — Z11.4 ENCOUNTER FOR SCREENING FOR HIV: ICD-10-CM

## 2021-01-18 DIAGNOSIS — Z13.1 DIABETES MELLITUS SCREENING: ICD-10-CM

## 2021-01-18 DIAGNOSIS — G89.29 CHRONIC PAIN OF LEFT KNEE: ICD-10-CM

## 2021-01-18 DIAGNOSIS — M48.02 CERVICAL STENOSIS OF SPINE: ICD-10-CM

## 2021-01-18 DIAGNOSIS — M51.9 LUMBAR DISC DISEASE: ICD-10-CM

## 2021-01-18 PROCEDURE — 99213 OFFICE O/P EST LOW 20 MIN: CPT | Performed by: NURSE PRACTITIONER

## 2021-01-18 ASSESSMENT — ENCOUNTER SYMPTOMS
WHEEZING: 0
VOMITING: 0
SHORTNESS OF BREATH: 0
BLOOD IN STOOL: 0
ABDOMINAL PAIN: 0
TROUBLE SWALLOWING: 0
DIARRHEA: 0

## 2021-01-18 NOTE — PROGRESS NOTES
Visit Information    Have you changed or started any medications since your last visit including any over-the-counter medicines, vitamins, or herbal medicines? no   Are you having any side effects from any of your medications? -  no  Have you stopped taking any of your medications? Is so, why? -  no    Have you seen any other physician or provider since your last visit? No  Have you had any other diagnostic tests since your last visit? No  Have you been seen in the emergency room and/or had an admission to a hospital since we last saw you? No  Have you had your routine dental cleaning in the past 6 months? no    Have you activated your [employer] account? If not, what are your barriers?  Yes     Patient Care Team:  ABBY Ordonez CNP as PCP - General (Family Medicine)  ABBY Ordonez CNP as PCP [facility]  Narinder Hair MD as Consulting Physician (Urology)    Medical History Review  Past Medical, Family, and Social History reviewed and does not contribute to the patient presenting condition    Health Maintenance   Topic Date Due    Hepatitis C screen  1975    HIV screen  06/15/1992    DTaP/Tdap/Td vaccine (1 - Tdap) 06/15/1996    Flu vaccine (1) 09/01/2020    Lipid screen  02/26/2025    Hepatitis A vaccine  Aged Out    Hepatitis B vaccine  Aged Out    Hib vaccine  Aged Out    Meningococcal (ACWY) vaccine  Aged Out    Pneumococcal 0-64 years Vaccine  Aged Out

## 2021-01-18 NOTE — PROGRESS NOTES
[facility]  [id number] [facility] [address]  Dept: [phone]  Dept Fax: [phone]    Patient Care Team:  ABBY Woods CNP as PCP - General (Family Medicine)  ABBY Woods CNP as PCP - [facility] Empaneled Provider  Erick Fernandes MD as Consulting Physician (Urology)    2021     Guanakito Hebert (:  [id number])HC a 39 y.o. male, here for evaluation of the following medical concerns:   Chief Complaint   Patient presents with   Emile Mckeon     pt would like STD check as well as blood work        [place] is here today for his annual follow-up and exam.  He denies any major changes from his last appointment. He would like some routine lab tests and STD screening as well    Works out, [employer]    Is having upper and lower back pain, numbness and weakness  He is seen by the [place]  He's been told his low back is related to his service injury when he was 21  However, his neck issue is not related to the service. 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 He thinks there is narrowing of C1-C4; unsure  Had MRI 3-4 years through the [place], cervical and lumbar spine  Gets numbness in his left arm and also his left leg  Tried gabapentin and did not help, just made him drowsy  Also tried Duloxetine, nortiptyline  Now takes tramadol at bedtime through the [place], he uses it sparingly and only on bad days. Otherwise he feels pain is controlled with NSAIDs  Previously in PT, was last year. He feels his left shoulder is often stiff in the morning and range of motion is limited, however this improves as the day goes on. The numbness and tingling does not improve  Just prescribed a TENS unit    Notices weakness in hands, trouble opening jars. Occurs both hands, left is worse  He feels the left has gotten worse since his last visit    Is  C. O bilat knee pain, had a scope done of the right knee. Doing better since then. Left knee is painful, can't run longer than 1/2 mile or gets worn out more easily playing basketball  Pain is at the top of the knee cap, also will swell in that area. Getting Diclofenac for pain from [place], also on baclofen  No recent fims of his left knee  Told knee is \"normal wear and tear\"  Has not seen orthopedics. Pain he has         . Review of Systems   Constitutional: Negative for appetite change, fever and unexpected weight change. HENT: Negative for hearing loss and trouble swallowing. Eyes: Negative for visual disturbance. Respiratory: Negative for shortness of breath and wheezing. Cardiovascular: Negative for chest pain and palpitations. Gastrointestinal: Negative for abdominal pain, blood in stool, diarrhea and vomiting. Endocrine: Negative for polydipsia and polyuria. Genitourinary: Negative for frequency and hematuria. Musculoskeletal: Positive for arthralgias and joint swelling. Negative for myalgias and neck pain. Neurological: Positive for weakness (LUE) and numbness. Negative for dizziness, seizures and headaches. Psychiatric/Behavioral: Negative for suicidal ideas. The patient is not nervous/anxious. Prior to Visit Medications    Medication Sig Taking?  Authorizing Provider   DICLOFENAC PO Take 25 mg by mouth 2 times daily as needed Yes Historical Provider, MD   tadalafil (CIALIS) 20 MG tablet TAKE ONE TABLET BY MOUTH EVERY DAY AS NEEDED Yes ABBY Clemente CNP   traMADol (ULTRAM) 50 MG tablet Take 50 mg by mouth every 6 hours as needed for Pain Yes Historical Provider, MD   ibuprofen (ADVIL;MOTRIN) 800 MG tablet Take 1 tablet by mouth every 8 hours as needed for Pain  Patient not taking: Reported on 1/18/2021  Sangeeta Puga MD        Social History     Tobacco Use    Smoking status: Never Smoker    Smokeless tobacco: Never Used   Substance Use Topics    Alcohol use: Yes     Comment: 5 week        Vitals:    01/18/21 1008 BP: 126/83   Pulse: 74   Temp: 98.4 °F (36.9 °C)   SpO2: 99%   Weight: 192 lb (87.1 kg)     Estimated body mass index is 26.04 kg/m² as calculated from the following:    Height as of 2/9/20: 6' (1.829 m). Weight as of this encounter: 192 lb (87.1 kg). DIAGNOSTIC FINDINGS:  CBC:  Lab Results   Component Value Date    WBC 6.2 04/26/2019    HGB 13.6 04/26/2019     04/26/2019       BMP:    Lab Results   Component Value Date     02/26/2020    K 4.3 02/26/2020     02/26/2020    CO2 25 02/26/2020    BUN 18 02/26/2020    CREATININE 1.14 02/26/2020    GLUCOSE 92 02/26/2020       HEMOGLOBIN A1C: No results found for: LABA1C    FASTING LIPID PANEL:  Lab Results   Component Value Date    HDL 67 02/26/2020       Physical Exam  Vitals signs and nursing note reviewed. Constitutional:       General: He is not in acute distress. Appearance: He is well-developed. HENT:      Head: Normocephalic. Right Ear: External ear normal.      Left Ear: External ear normal.      Mouth/Throat:      Mouth: Mucous membranes are moist.   Eyes:      General: No scleral icterus. Pupils: Pupils are equal, round, and reactive to light. Neck:      Musculoskeletal: Normal range of motion and neck supple. No muscular tenderness. Cardiovascular:      Rate and Rhythm: Normal rate and regular rhythm. Pulmonary:      Effort: Pulmonary effort is normal.      Breath sounds: Normal breath sounds. Abdominal:      Palpations: Abdomen is soft. Musculoskeletal:      Right lower leg: No edema. Left lower leg: No edema. Comments: 4/5 strength in LUE with  strength  Neg Tinnels sign  3/5 LLE strength, 5/5 RLE   Lymphadenopathy:      Cervical: No cervical adenopathy. Skin:     General: Skin is warm and dry. Neurological:      Mental Status: He is alert and oriented to person, place, and time. Cranial Nerves: No facial asymmetry. Motor: No abnormal muscle tone.       Coordination: Coordination normal.      Gait: Gait normal.      Comments: No foot drop   Psychiatric:         Mood and Affect: Mood is anxious. Affect is not inappropriate. Speech: Speech normal.         Behavior: Behavior normal. Behavior is cooperative. Thought Content: Thought content normal.         Judgment: Judgment normal.         ASSESSMENT     Diagnosis Orders   1. Radicular pain of left upper extremity  [facility], Neurology, [place]    MRI CERVICAL SPINE WO CONTRAST   2. Screen for STD (sexually transmitted disease)  Chlamydia/GC DNA, Urine   3. Lumbar disc disease  [facility], Neurology, [facility]   4. Encounter for screening for HIV  HIV Screen   5. Need for hepatitis C screening test  Hepatitis C Antibody   6. Cervical stenosis of spine  MRI CERVICAL SPINE WO CONTRAST   7. Lumbar disc herniation with radiculopathy  MRI LUMBAR SPINE WO CONTRAST   8. Chronic pain of left knee  [facility] and Sports Medicine   9. Diabetes mellitus screening  Comprehensive Metabolic Panel, Fasting   10. Lipid screening  Lipid, Fasting          PLAN:  No orders of the defined types were placed in this encounter. 1. We will place another referral to neurology, patient encouraged to follow-up. Given worsening radicular symptoms, MRI of cervical lumbar spine ordered. I encouraged Elieser Chan to obtain disc records of previous imaging for specialists. 2. Knee pain present to palpation today, no obvious deformity or swelling. Patient is requesting further evaluation given chronicity and similarity of pain previous to right knee arthroscopy. Orthopedics referral placed  3. Routine lab orders placed, including STD screening as patient's request.    FOLLOW UP AND INSTRUCTIONS:  Return in about 1 year (around 1/18/2022). · Elieser Chan received counseling on the following healthy behaviors:exercise    · Discussed use, benefit, and side effects of prescribed medications. Barriers to  medication compliance addressed. All patient questions answered. Pt  verbalized understanding of all instructions given. · Patient given educational materials - see patient instructions      · Patient advised to contact scheduling offices for any referrals or imaging orders  placed today if they have not been contacted in 48 hours. Return in about 1 year (around 1/18/2022). An electronic signature was used to authenticate this note.     --Noah Acuña, ABBY - CNP on 1/18/2021 at 11:48 AM

## 2021-01-20 ENCOUNTER — HOSPITAL ENCOUNTER (OUTPATIENT)
Age: 46
Discharge: HOME | End: 2021-01-20
Payer: COMMERCIAL

## 2021-01-20 DIAGNOSIS — Z13.220 LIPID SCREENING: ICD-10-CM

## 2021-01-20 DIAGNOSIS — Z11.59 NEED FOR HEPATITIS C SCREENING TEST: ICD-10-CM

## 2021-01-20 DIAGNOSIS — Z13.1 DIABETES MELLITUS SCREENING: ICD-10-CM

## 2021-01-20 DIAGNOSIS — Z11.3 SCREEN FOR STD (SEXUALLY TRANSMITTED DISEASE): ICD-10-CM

## 2021-01-20 DIAGNOSIS — Z11.4 ENCOUNTER FOR SCREENING FOR HIV: ICD-10-CM

## 2021-01-20 LAB
ALBUMIN SERPL-MCNC: 4.6 G/DL (ref 3.5–5.2)
ALBUMIN/GLOBULIN RATIO: 1.8 (ref 1–2.5)
ALP BLD-CCNC: 56 U/L (ref 40–129)
ALT SERPL-CCNC: 10 U/L (ref 5–41)
ANION GAP SERPL CALCULATED.3IONS-SCNC: 9 MMOL/L (ref 9–17)
AST SERPL-CCNC: 30 U/L
BILIRUB SERPL-MCNC: 0.63 MG/DL (ref 0.3–1.2)
BUN BLDV-MCNC: 24 MG/DL (ref 6–20)
BUN/CREAT BLD: ABNORMAL (ref 9–20)
CALCIUM SERPL-MCNC: 9.2 MG/DL (ref 8.6–10.4)
CHLORIDE BLD-SCNC: 106 MMOL/L (ref 98–107)
CHOLESTEROL, FASTING: 184 MG/DL
CHOLESTEROL/HDL RATIO: 2.8
CO2: 26 MMOL/L (ref 20–31)
CREAT SERPL-MCNC: 1.1 MG/DL (ref 0.7–1.2)
GFR AFRICAN AMERICAN: >60 ML/MIN
GFR NON-AFRICAN AMERICAN: >60 ML/MIN
GFR SERPL CREATININE-BSD FRML MDRD: ABNORMAL ML/MIN/{1.73_M2}
GFR SERPL CREATININE-BSD FRML MDRD: ABNORMAL ML/MIN/{1.73_M2}
GLUCOSE FASTING: 96 MG/DL (ref 70–99)
HDLC SERPL-MCNC: 66 MG/DL
HEPATITIS C ANTIBODY: NONREACTIVE
HIV AG/AB: NONREACTIVE
LDL CHOLESTEROL: 104 MG/DL (ref 0–130)
POTASSIUM SERPL-SCNC: 4 MMOL/L (ref 3.7–5.3)
SODIUM BLD-SCNC: 141 MMOL/L (ref 135–144)
TOTAL PROTEIN: 7.1 G/DL (ref 6.4–8.3)
TRIGLYCERIDE, FASTING: 69 MG/DL
VLDLC SERPL CALC-MCNC: NORMAL MG/DL (ref 1–30)

## 2021-01-20 PROCEDURE — 87591 N.GONORRHOEAE DNA AMP PROB: CPT

## 2021-01-20 PROCEDURE — 86803 HEPATITIS C AB TEST: CPT

## 2021-01-20 PROCEDURE — 87389 HIV-1 AG W/HIV-1&-2 AB AG IA: CPT

## 2021-01-20 PROCEDURE — 80053 COMPREHEN METABOLIC PANEL: CPT

## 2021-01-20 PROCEDURE — 80061 LIPID PANEL: CPT

## 2021-01-20 PROCEDURE — 36415 COLL VENOUS BLD VENIPUNCTURE: CPT

## 2021-01-20 PROCEDURE — 87491 CHLMYD TRACH DNA AMP PROBE: CPT

## 2021-01-21 LAB
C. TRACHOMATIS DNA ,URINE: NEGATIVE
N. GONORRHOEAE DNA, URINE: NEGATIVE
SPECIMEN DESCRIPTION: NORMAL

## 2021-01-22 DIAGNOSIS — M25.562 ACUTE PAIN OF LEFT KNEE: Primary | ICD-10-CM

## 2021-01-25 ENCOUNTER — TELEPHONE (OUTPATIENT)
Dept: PRIMARY CARE CLINIC | Age: 46
End: 2021-01-25

## 2021-01-25 NOTE — TELEPHONE ENCOUNTER
Patient called requesting results of lab work completed on 1/20/21, informed patient that results have not been reviewed by provider yet.

## 2021-03-31 ENCOUNTER — OFFICE VISIT (OUTPATIENT)
Dept: NEUROLOGY | Age: 46
End: 2021-03-31
Payer: COMMERCIAL

## 2021-03-31 VITALS
HEART RATE: 72 BPM | WEIGHT: 197 LBS | HEIGHT: 73 IN | BODY MASS INDEX: 26.11 KG/M2 | TEMPERATURE: 97.2 F | SYSTOLIC BLOOD PRESSURE: 122 MMHG | DIASTOLIC BLOOD PRESSURE: 78 MMHG

## 2021-03-31 DIAGNOSIS — M54.2 CERVICALGIA: ICD-10-CM

## 2021-03-31 DIAGNOSIS — M79.2 NEUROPATHIC PAIN: Primary | ICD-10-CM

## 2021-03-31 DIAGNOSIS — M54.42 CHRONIC LEFT-SIDED LOW BACK PAIN WITH LEFT-SIDED SCIATICA: ICD-10-CM

## 2021-03-31 DIAGNOSIS — G89.29 CHRONIC LEFT-SIDED LOW BACK PAIN WITH LEFT-SIDED SCIATICA: ICD-10-CM

## 2021-03-31 PROCEDURE — 99204 OFFICE O/P NEW MOD 45 MIN: CPT | Performed by: PSYCHIATRY & NEUROLOGY

## 2021-03-31 RX ORDER — BACLOFEN 10 MG/1
10 TABLET ORAL 2 TIMES DAILY
COMMUNITY

## 2021-03-31 RX ORDER — MULTIVIT-MIN/IRON/FOLIC ACID/K 18-600-40
CAPSULE ORAL
COMMUNITY

## 2021-03-31 RX ORDER — GABAPENTIN 100 MG/1
100 CAPSULE ORAL 3 TIMES DAILY
Qty: 90 CAPSULE | Refills: 5 | Status: SHIPPED | OUTPATIENT
Start: 2021-03-31 | End: 2022-10-07

## 2021-03-31 NOTE — PROGRESS NOTES
HEENT [] Hearing Loss  [] Visual Disturbance  [] Tinnitus  [] Eye pain   Respiratory [] Shortness of Breath  [] Cough  [] Snoring   Cardiovascular [] Chest Pain  [] Palpitations  [] Lightheaded   GI [] Constipation  [] Diarrhea  [] Swallowing change  [] Nausea/vomiting    [] Urinary Frequency  [] Urinary Urgency   Musculoskeletal [x] Neck pain  [x] Back pain  [] Muscle pain  [] Restless legs   Dermatologic [] Skin changes   Neurologic [] Memory loss/confusion  [] Seizures  [] Trouble walking or imbalance  [] Dizziness  [] Sleep disturbance  [x] Weakness  [] Numbness  [] Tremors  [] Speech Difficulty  [] Headaches  [] Light Sensitivity  [] Sound Sensitivity   Endocrinology []Excessive thirst  []Excessive hunger   Psychiatric [] Anxiety/Depression  [] Hallucination   Allergy/immunology []Hives/environmental allergies   Hematologic/lymph [] Abnormal bleeding  [] Abnormal bruising

## 2021-03-31 NOTE — PROGRESS NOTES
NEUROLOGY CONSULT  Patient Name:       Param Done  :        1975  Clinic Visit Date:    3/31/2021    Dear Dr. Telly Pagan, APRN - CNP     I had the opportunity to see your patient, Mr. Virgen Done in neurology consultation today. As you know he  is a 39 y.o. right handed  male presented with c/o low back ache and neck pain for about 25 years. He was hurt at work in [address] [place] [address] and was working as a  and humvee fell on him, knocked him down, hit his head with no LOC. He was taken to [facility] in [place], [place] and he had imaging studies afterwards and also was under care of neurologist in [facility], Robert Ruelas and he was told to have \"possible nerve damage in lower back\". Never needed any surgical interventions in the past.  Denied bladder and bowel dysfunction. He stated that he has been having ongoing back pain radiating down left lower extremity and extending to left foot at times. His pain is described as aching pain of moderately severe intensity with fluctuating intensity and severity. He has exacerbation of pain with certain positions and sitting/standing for longer duration makes the pain worse. He also has neck pain radiating down left arm along with numbness and tingling. He does have intermittent tingling sensation radiating down from back of the neck extending to all of the digits of left hand at times. He does have associated weakness involving left upper and left lower extremities. Denies bladder and bowel dysfunction. He does admit to having balance difficulties with weakness on occasion. Denied falls. He is concerned about weakness in left side of the body. Denies febrile illnesses. Review of systems done by staff reviewed and pertinent positives include weakness, neck pain and back pain as stated above.     Current Outpatient Medications on File Prior to Visit   Medication Sig Dispense Refill    Cholecalciferol (VITAMIN D) 50 MCG (2000) CAPS capsule Take by mouth      baclofen (LIORESAL) 10 MG tablet Take 10 mg by mouth 2 times daily      tadalafil (CIALIS) 20 MG tablet TAKE ONE TABLET BY MOUTH EVERY DAY AS NEEDED 20 tablet 3    DICLOFENAC PO Take 25 mg by mouth 2 times daily as needed      traMADol (ULTRAM) 50 MG tablet Take 50 mg by mouth every 6 hours as needed for Pain      ibuprofen (ADVIL;MOTRIN) 800 MG tablet Take 1 tablet by mouth every 8 hours as needed for Pain (Patient not taking: Reported on 1/18/2021) 90 tablet 5     No current facility-administered medications on file prior to visit. Allergies: Brittany Nicole has No Known Allergies. Past Medical History:   Diagnosis Date    Arthritis     Caffeine use     1 tea, 2 soda/day    Chronic back pain     Erectile dysfunction        Past Surgical History:   Procedure Laterality Date    VASECTOMY Bilateral 12/21/2016     Social History: Brittany Nicole  reports that he has never smoked. He has never used smokeless tobacco. He reports current alcohol use. He reports that he does not use drugs. History reviewed. No pertinent family history. On exam: Blood pressure 122/78, pulse 72, temperature 97.2 °F (36.2 °C), temperature source Temporal, height 6' 1\" (1.854 m), weight 197 lb (89.4 kg).         NEUROLOGIC EXAMINATION  GENERAL  Appears comfortable and in no distress   HEENT  NC/ AT   NECK  Supple and no bruits heard   MENTAL STATUS:  Alert, oriented, intact memory, no confusion, normal speech, normal language, no hallucination or delusion   CRANIAL NERVES: II     -      Visual fields intact to confrontation  III,IV,VI -  EOMs full, no afferent defect, no                      KAVON, no ptosis  V     -     Normal facial sensation  VII    -     Normal facial symmetry  VIII   -     Intact hearing  IX,X -     Symmetrical palate  XI    -     Symmetrical shoulder shrug  XII   -     Midline tongue, no atrophy    MOTOR FUNCTION:  significant for mild weakness of grade 4+/5 in left lower extremity and 5 -/5 in left upper extremity; 5/5 in right upper and right lower extremities with normal bulk, normal tone and no abnormal involuntary movements. SLR negative. Tenderness in lower back and neck stiffness noted on passive ROM. Spurling sign negative. SENSORY FUNCTION:  Diminished light touch and pinprick in left lower extremity in nondermatomal distribution. CEREBELLAR FUNCTION:  Intact fine motor control over upper limbs   REFLEX FUNCTION:  Symmetric, no perverted reflex, no Babinski sign   STATION and GAIT  Normal station, normal gait, slight difficulty with tandem gait. Diagnostic data reviewed with the patient:   None available. Impression and Plan: Mr. Marshall Nguyen is a 39 y.o. male with   Twenty five year hx of low back pain and neck pain with resultant left upper and left lower extremity weakness; Left lower lumbar radiculopathy occurring intermittently; on schedule for MRI cervical spine and MRI lumbar spine. Patient to call our office once the testing done for further instructions. Neuropathic pain in left lower extremity; tingling and abnormal sensations in left upper extremity: will start him on gabapentin 100 mg tid with gradual dose escalation. He is presently on baclofen for muscle spasms. Medication counseling including risks and benefits discussed. Will see him in follow-up in 4 to 6 weeks. Please note that portions of this note were completed with a voice recognition program.  Although every effort was made to ensure the accuracy of this automated transcription, some errors in transcription may have occurred; occasionally words are mis-transcribed. Thank you for understanding.

## 2021-03-31 NOTE — LETTER
[facility]  [address] [place] [address]  Phone: [phone]  Fax: [phone]    Rosa Zamora MD        2021     ABBY Ching [address] [place] [address]    Patient: Nathalia Krishnamurthy  MR Number: [id number]  YOB: 1975  Date of Visit: 3/31/2021    Dear Dr. Carl Marcelino: Thank you for the request for consultation for Nirav Lubin to me for the evaluation of RAUL MUNGUIA  Below are the relevant portions of my assessment and plan of care. NEUROLOGY CONSULT  Patient Name:       Nathalia Krishnamurthy  :        1975  Clinic Visit Date:    3/31/2021    Dear ABBY Yeh CNP     I had the opportunity to see your patient, Mr. Nathalia Krishnamurthy in neurology consultation today. As you know he  is a 39 y.o. right handed  male presented with c/o low back ache and neck pain for about 25 years. He was hurt at work in [address] [place] [address] and was working as a  and humvee fell on him, knocked him down, hit his head with no LOC. He was taken to [facility] in [place], [place] and he had imaging studies afterwards and also was under care of neurologist in [facility], Eusebio Connolly and he was told to have \"possible nerve damage in lower back\". Never needed any surgical interventions in the past.  Denied bladder and bowel dysfunction. He stated that he has been having ongoing back pain radiating down left lower extremity and extending to left foot at times. His pain is described as aching pain of moderately severe intensity with fluctuating intensity and severity. He has exacerbation of pain with certain positions and sitting/standing for longer duration makes the pain worse. He also has neck pain radiating down left arm along with numbness and tingling.   He does have intermittent tingling sensation radiating down from back of the neck extending to all of the digits of left hand at times. He does have associated weakness involving left upper and left lower extremities. Denies bladder and bowel dysfunction. He does admit to having balance difficulties with weakness on occasion. Denied falls. He is concerned about weakness in left side of the body. Denies febrile illnesses. Review of systems done by staff reviewed and pertinent positives include weakness, neck pain and back pain as stated above. Current Outpatient Medications on File Prior to Visit   Medication Sig Dispense Refill    Cholecalciferol (VITAMIN D) 50 MCG (2000 UT) CAPS capsule Take by mouth      baclofen (LIORESAL) 10 MG tablet Take 10 mg by mouth 2 times daily      tadalafil (CIALIS) 20 MG tablet TAKE ONE TABLET BY MOUTH EVERY DAY AS NEEDED 20 tablet 3    DICLOFENAC PO Take 25 mg by mouth 2 times daily as needed      traMADol (ULTRAM) 50 MG tablet Take 50 mg by mouth every 6 hours as needed for Pain      ibuprofen (ADVIL;MOTRIN) 800 MG tablet Take 1 tablet by mouth every 8 hours as needed for Pain (Patient not taking: Reported on 1/18/2021) 90 tablet 5     No current facility-administered medications on file prior to visit. Allergies: Haley Strickland has No Known Allergies. Past Medical History:   Diagnosis Date    Arthritis     Caffeine use     1 tea, 2 soda/day    Chronic back pain     Erectile dysfunction        Past Surgical History:   Procedure Laterality Date    VASECTOMY Bilateral 12/21/2016     Social History: Haley Strickland  reports that he has never smoked. He has never used smokeless tobacco. He reports current alcohol use. He reports that he does not use drugs. History reviewed. No pertinent family history. On exam: Blood pressure 122/78, pulse 72, temperature 97.2 °F (36.2 °C), temperature source Temporal, height 6' 1\" (1.854 m), weight 197 lb (89.4 kg).         NEUROLOGIC EXAMINATION  GENERAL  Appears comfortable and in no distress   HEENT  NC/ AT   NECK  Supple and no bruits heard   MENTAL STATUS:  Alert, oriented, intact memory, no confusion, normal speech, normal language, no hallucination or delusion   CRANIAL NERVES: II     -      Visual fields intact to confrontation  III,IV,VI -  EOMs full, no afferent defect, no                      KAVON, no ptosis  V     -     Normal facial sensation  VII    -     Normal facial symmetry  VIII   -     Intact hearing  IX,X -     Symmetrical palate  XI    -     Symmetrical shoulder shrug  XII   -     Midline tongue, no atrophy    MOTOR FUNCTION:  significant for mild weakness of grade 4+/5 in left lower extremity and 5 -/5 in left upper extremity; 5/5 in right upper and right lower extremities with normal bulk, normal tone and no abnormal involuntary movements. SLR negative. Tenderness in lower back and neck stiffness noted on passive ROM. Spurling sign negative. SENSORY FUNCTION:  Diminished light touch and pinprick in left lower extremity in nondermatomal distribution. CEREBELLAR FUNCTION:  Intact fine motor control over upper limbs   REFLEX FUNCTION:  Symmetric, no perverted reflex, no Babinski sign   STATION and GAIT  Normal station, normal gait, slight difficulty with tandem gait. Diagnostic data reviewed with the patient:   None available. Impression and Plan: Mr. Kyle Davidson is a 39 y.o. male with   Twenty five year hx of low back pain and neck pain with resultant left upper and left lower extremity weakness; Left lower lumbar radiculopathy occurring intermittently; on schedule for MRI cervical spine and MRI lumbar spine. Patient to call our office once the testing done for further instructions. Neuropathic pain in left lower extremity; tingling and abnormal sensations in left upper extremity: will start him on gabapentin 100 mg tid with gradual dose escalation. He is presently on baclofen for muscle spasms. Medication counseling including risks and benefits discussed.    Will see him in follow-up in 4 to 6 weeks. Please note that portions of this note were completed with a voice recognition program.  Although every effort was made to ensure the accuracy of this automated transcription, some errors in transcription may have occurred; occasionally words are mis-transcribed. Thank you for understanding. If you have questions, please do not hesitate to call me. I look forward to following Elieser Chan along with you.     Sincerely,        Lexis Clayton MD

## 2021-05-06 NOTE — TELEPHONE ENCOUNTER
Health Maintenance   Topic Date Due    COVID-19 Vaccine (1) Never done    DTaP/Tdap/Td vaccine (1 - Tdap) Never done    Flu vaccine (Season Ended) 09/01/2021    Lipid screen  01/20/2026    Hepatitis C screen  Completed    HIV screen  Completed    Hepatitis A vaccine  Aged Out    Hepatitis B vaccine  Aged Out    Hib vaccine  Aged Out    Meningococcal (ACWY) vaccine  Aged Out    Pneumococcal 0-64 years Vaccine  Aged Out             (applicable per patient's age: Cancer Screenings, Depression Screening, Fall Risk Screening, Immunizations)    LDL Cholesterol (mg/dL)   Date Value   01/20/2021 104     AST (U/L)   Date Value   01/20/2021 30     ALT (U/L)   Date Value   01/20/2021 10     BUN (mg/dL)   Date Value   01/20/2021 24 (H)      (goal A1C is < 7)   (goal LDL is <100) need 30-50% reduction from baseline     BP Readings from Last 3 Encounters:   03/31/21 122/78   01/18/21 126/83   02/18/20 122/80    (goal /80)      All Future Testing planned in [facility]:  Lab Frequency Next Occurrence   MRI CERVICAL SPINE WO CONTRAST Once 05/02/2021   MRI LUMBAR SPINE WO CONTRAST Once 05/02/2021   XR KNEE LEFT (1-2 VIEWS) Once 01/25/2021       Next Visit Date:  No future appointments.          Patient Active Problem List:     Knee pain, left     Lumbar disc disease     Right shoulder pain     Neck pain on right side     Family planning

## 2021-05-07 RX ORDER — TADALAFIL 20 MG/1
TABLET ORAL
Qty: 20 TABLET | Refills: 3 | Status: SHIPPED | OUTPATIENT
Start: 2021-05-07 | End: 2021-07-29

## 2021-06-11 ENCOUNTER — TELEPHONE (OUTPATIENT)
Dept: PRIMARY CARE CLINIC | Age: 46
End: 2021-06-11

## 2021-06-11 NOTE — TELEPHONE ENCOUNTER
He does have to come in because this is new paperwork that has not been completed for the last 4 years, so I need his assistance during the visit to discuss what modifications he needs

## 2021-06-22 ENCOUNTER — OFFICE VISIT (OUTPATIENT)
Dept: PRIMARY CARE CLINIC | Age: 46
End: 2021-06-22
Payer: COMMERCIAL

## 2021-06-22 VITALS
WEIGHT: 193.2 LBS | OXYGEN SATURATION: 100 % | HEART RATE: 77 BPM | BODY MASS INDEX: 25.49 KG/M2 | SYSTOLIC BLOOD PRESSURE: 120 MMHG | TEMPERATURE: 98.7 F | DIASTOLIC BLOOD PRESSURE: 76 MMHG

## 2021-06-22 DIAGNOSIS — G62.9 NEUROPATHY: ICD-10-CM

## 2021-06-22 DIAGNOSIS — M54.42 CHRONIC LEFT-SIDED LOW BACK PAIN WITH LEFT-SIDED SCIATICA: ICD-10-CM

## 2021-06-22 DIAGNOSIS — G89.29 CHRONIC LEFT-SIDED LOW BACK PAIN WITH LEFT-SIDED SCIATICA: ICD-10-CM

## 2021-06-22 DIAGNOSIS — M51.9 LUMBAR DISC DISEASE: Primary | ICD-10-CM

## 2021-06-22 PROCEDURE — 99214 OFFICE O/P EST MOD 30 MIN: CPT | Performed by: NURSE PRACTITIONER

## 2021-06-22 SDOH — ECONOMIC STABILITY: FOOD INSECURITY: WITHIN THE PAST 12 MONTHS, THE FOOD YOU BOUGHT JUST DIDN'T LAST AND YOU DIDN'T HAVE MONEY TO GET MORE.: NEVER TRUE

## 2021-06-22 SDOH — ECONOMIC STABILITY: FOOD INSECURITY: WITHIN THE PAST 12 MONTHS, YOU WORRIED THAT YOUR FOOD WOULD RUN OUT BEFORE YOU GOT MONEY TO BUY MORE.: NEVER TRUE

## 2021-06-22 ASSESSMENT — ENCOUNTER SYMPTOMS
WHEEZING: 0
TROUBLE SWALLOWING: 0
VOMITING: 0
ABDOMINAL PAIN: 0
DIARRHEA: 0
SHORTNESS OF BREATH: 0
BLOOD IN STOOL: 0

## 2021-06-22 ASSESSMENT — PATIENT HEALTH QUESTIONNAIRE - PHQ9
SUM OF ALL RESPONSES TO PHQ9 QUESTIONS 1 & 2: 0
SUM OF ALL RESPONSES TO PHQ QUESTIONS 1-9: 0
1. LITTLE INTEREST OR PLEASURE IN DOING THINGS: 0
SUM OF ALL RESPONSES TO PHQ QUESTIONS 1-9: 0
2. FEELING DOWN, DEPRESSED OR HOPELESS: 0
SUM OF ALL RESPONSES TO PHQ QUESTIONS 1-9: 0

## 2021-06-22 ASSESSMENT — SOCIAL DETERMINANTS OF HEALTH (SDOH): HOW HARD IS IT FOR YOU TO PAY FOR THE VERY BASICS LIKE FOOD, HOUSING, MEDICAL CARE, AND HEATING?: NOT HARD AT ALL

## 2021-06-22 NOTE — PROGRESS NOTES
[facility]  [id number] [facility] [place] [address]  Dept: [phone]  Dept Fax: [phone]    Patient Care Team:  ABBY Holt CNP as PCP - General (Family Medicine)  ABBY Holt CNP as PCP - REHABILITATION [facility] Empaneled Provider  Terrance Dan MD as Consulting Physician (Urology)    2021     Awakrystanohelia Stephanie [phone] a 42 y.o. male, here for evaluation of the following medical concerns:   Chief Complaint   Patient presents with   [address]       Asking for FMLA for his back  New job, not allowing how much flexibility  Medications such as muscle relaxers make him drowsy    Currently works for all [employer] in the [employer] room    He was evaluated in March by neurology, he has not followed up since then. MRI ordered in January not completed. He does not recall why he has not completed the MRI. Malinda Avalos maintains he feels the symptoms are worsening since his last visit, in general just worsening over time. Is having upper and lower back pain, numbness and weakness  He is seen by the [place]  He's been told his low back is related to his service injury when he was 21  However, his neck issue is not related to the service. 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 He thinks there is narrowing of C1-C4; unsure  Had MRI 3-4 years through the [place], cervical and lumbar spine  Gets numbness in his left arm and also his left leg  Tried gabapentin and did not help, just made him drowsy  Also tried Duloxetine, nortiptyline  Now takes tramadol at bedtime through the [place], he uses it sparingly and only on bad days. Otherwise he feels pain is controlled with NSAIDs  Previously in PT, was last year. He feels his left shoulder is often stiff in the morning and range of motion is limited, however this improves as the day goes on.   The numbness and tingling does not improve  Just prescribed a TENS unit    Notices weakness in hands, trouble opening jars. Occurs both hands, left is worse  He feels the left has gotten worse since his last visit      . Review of Systems   Constitutional: Negative for appetite change, fever and unexpected weight change. HENT: Negative for hearing loss and trouble swallowing. Eyes: Negative for visual disturbance. Respiratory: Negative for shortness of breath and wheezing. Cardiovascular: Negative for chest pain and palpitations. Gastrointestinal: Negative for abdominal pain, blood in stool, diarrhea and vomiting. Endocrine: Negative for polydipsia and polyuria. Genitourinary: Negative for frequency and hematuria. Musculoskeletal: Positive for arthralgias and joint swelling. Negative for myalgias and neck pain. Neurological: Positive for weakness (LUE) and numbness. Negative for dizziness, seizures and headaches. Psychiatric/Behavioral: Negative for suicidal ideas. The patient is not nervous/anxious. Prior to Visit Medications    Medication Sig Taking? Authorizing Provider   tadalafil (CIALIS) 20 MG tablet TAKE ONE TABLET BY MOUTH EVERY DAY AS NEEDED Yes Tahir Hernandez, APRN - CNP   Cholecalciferol (VITAMIN D) 50 MCG (2000 UT) CAPS capsule Take by mouth Yes Historical Provider, MD   baclofen (LIORESAL) 10 MG tablet Take 10 mg by mouth 2 times daily Yes Historical Provider, MD   gabapentin (NEURONTIN) 100 MG capsule Take 1 capsule by mouth 3 times daily for 180 days.  Intended supply: 30 days Yes Paulie Good MD   DICLOFENAC PO Take 25 mg by mouth 2 times daily as needed Yes Historical Provider, MD   traMADol (ULTRAM) 50 MG tablet Take 50 mg by mouth every 6 hours as needed for Pain Yes Historical Provider, MD        Social History     Tobacco Use    Smoking status: Never Smoker    Smokeless tobacco: Never Used   Substance Use Topics    Alcohol use: Yes     Comment: 5 week        Vitals:    06/22/21 0825   BP: 120/76   Site: Left Motor: No abnormal muscle tone. Coordination: Coordination normal.      Gait: Gait normal.      Comments: No foot drop   Psychiatric:         Mood and Affect: Mood is anxious. Affect is not inappropriate. Speech: Speech normal.         Behavior: Behavior normal. Behavior is cooperative. Thought Content: Thought content normal.         Judgment: Judgment normal.         ASSESSMENT     Diagnosis Orders   1. Lumbar disc disease     2. Neuropathy     3. Chronic left-sided low back pain with left-sided sciatica            PLAN:  No orders of the defined types were placed in this encounter. 1. Reviewed previous visit notes as well as specialty visits and former imaging. Unfortunately, patient does not continue to follow-up with any specialist here at [facility]. [facility] and has completed no imaging since 2015. There is a former FMLA form completed from his last PCP from 2017.  2. I discussed with Shefali Meraz his needs in regards to days off work. He is unable to verbalize any specifics, he maintains he has not had to call off in the last few months but was frustrated that he could not leave early last week when he was concerned about having side effects from his medication. He also states he has had episodes over the past years where he is had issues with his pain for up to 4 days. 3. At this time I will renew his FMLA, parameters allowing 2 days off per week, 1 full day per episode. However, Shefali Meraz appears frustrated by this but is still unable to articulate what he feels this time off needs could be. I reassured him that if he is taking off more time than allowed, we can always revisit and amend the FMLA. 4. I did attempt to find former imaging through one content dating beyond our electronic record. I can find no MRI findings outside of his knee from 2009    [address]:  Return in about 6 months (around 12/22/2021).     · Shefali Meraz received counseling on the following healthy Hepatitis A vaccine  Aged Out    Hepatitis B vaccine  Aged Out    Hib vaccine  Aged Out    Meningococcal (ACWY) vaccine  Aged Out    Pneumococcal 0-64 years Vaccine  Aged Out

## 2021-07-29 RX ORDER — TADALAFIL 20 MG/1
TABLET ORAL
Qty: 20 TABLET | Refills: 3 | Status: SHIPPED | OUTPATIENT
Start: 2021-07-29 | End: 2022-03-09 | Stop reason: SDUPTHER

## 2021-07-29 NOTE — TELEPHONE ENCOUNTER
Health Maintenance   Topic Date Due    COVID-19 Vaccine (1) Never done    DTaP/Tdap/Td vaccine (1 - Tdap) Never done    Flu vaccine (1) 09/01/2021    Lipid screen  01/20/2026    Hepatitis C screen  Completed    HIV screen  Completed    Hepatitis A vaccine  Aged Out    Hepatitis B vaccine  Aged Out    Hib vaccine  Aged Out    Meningococcal (ACWY) vaccine  Aged Out    Pneumococcal 0-64 years Vaccine  Aged Out             (applicable per patient's age: Cancer Screenings, Depression Screening, Fall Risk Screening, Immunizations)    LDL Cholesterol (mg/dL)   Date Value   01/20/2021 104     AST (U/L)   Date Value   01/20/2021 30     ALT (U/L)   Date Value   01/20/2021 10     BUN (mg/dL)   Date Value   01/20/2021 24 (H)      (goal A1C is < 7)   (goal LDL is <100) need 30-50% reduction from baseline     BP Readings from Last 3 Encounters:   06/22/21 120/76   03/31/21 122/78   01/18/21 126/83    (goal /80)      All Future Testing planned in [facility]:  Lab Frequency Next Occurrence   MRI CERVICAL SPINE WO CONTRAST Once 08/17/2021   MRI LUMBAR SPINE WO CONTRAST Once 08/17/2021   XR KNEE LEFT (1-2 VIEWS) Once 01/25/2021       Next Visit Date:  No future appointments.          Patient Active Problem List:     Knee pain, left     Lumbar disc disease     Right shoulder pain     Neck pain on right side     Family planning

## 2021-09-01 ENCOUNTER — OFFICE VISIT (OUTPATIENT)
Dept: PRIMARY CARE CLINIC | Age: 46
End: 2021-09-01
Payer: COMMERCIAL

## 2021-09-01 VITALS
HEART RATE: 55 BPM | WEIGHT: 194 LBS | DIASTOLIC BLOOD PRESSURE: 82 MMHG | OXYGEN SATURATION: 100 % | TEMPERATURE: 97.5 F | BODY MASS INDEX: 25.6 KG/M2 | SYSTOLIC BLOOD PRESSURE: 122 MMHG

## 2021-09-01 DIAGNOSIS — M51.9 LUMBAR DISC DISEASE: Primary | ICD-10-CM

## 2021-09-01 DIAGNOSIS — M54.42 CHRONIC LEFT-SIDED LOW BACK PAIN WITH LEFT-SIDED SCIATICA: ICD-10-CM

## 2021-09-01 DIAGNOSIS — G89.29 CHRONIC LEFT-SIDED LOW BACK PAIN WITH LEFT-SIDED SCIATICA: ICD-10-CM

## 2021-09-01 DIAGNOSIS — H61.23 BILATERAL IMPACTED CERUMEN: ICD-10-CM

## 2021-09-01 PROCEDURE — 69210 REMOVE IMPACTED EAR WAX UNI: CPT | Performed by: NURSE PRACTITIONER

## 2021-09-01 PROCEDURE — 99213 OFFICE O/P EST LOW 20 MIN: CPT | Performed by: NURSE PRACTITIONER

## 2021-09-01 NOTE — PROGRESS NOTES
Alcohol use: Yes     Comment: 5 week        Vitals:    09/01/21 0817   BP: 122/82   Site: Left Upper Arm   Position: Sitting   Cuff Size: Large Adult   Pulse: 55   Temp: 97.5 °F (36.4 °C)   TempSrc: Temporal   SpO2: 100%   Weight: 194 lb (88 kg)     Estimated body mass index is 25.6 kg/m² as calculated from the following:    Height as of 3/31/21: 6' 1\" (1.854 m). Weight as of this encounter: 194 lb (88 kg). DIAGNOSTIC FINDINGS:  CBC:  Lab Results   Component Value Date    WBC 6.2 04/26/2019    HGB 13.6 04/26/2019     04/26/2019       BMP:    Lab Results   Component Value Date     01/20/2021    K 4.0 01/20/2021     01/20/2021    CO2 26 01/20/2021    BUN 24 01/20/2021    CREATININE 1.10 01/20/2021    GLUCOSE 92 02/26/2020       HEMOGLOBIN A1C: No results found for: LABA1C    FASTING LIPID PANEL:  Lab Results   Component Value Date    HDL 66 01/20/2021       Physical Exam  Vitals and nursing note reviewed. Constitutional:       General: He is not in acute distress. Appearance: He is well-developed. HENT:      Head: Normocephalic. Right Ear: External ear normal. There is impacted cerumen. Left Ear: External ear normal. There is impacted cerumen. Mouth/Throat:      Mouth: Mucous membranes are moist.   Eyes:      General: No scleral icterus. Pupils: Pupils are equal, round, and reactive to light. Cardiovascular:      Rate and Rhythm: Normal rate and regular rhythm. Pulmonary:      Effort: Pulmonary effort is normal.      Breath sounds: Normal breath sounds. Abdominal:      Palpations: Abdomen is soft. Musculoskeletal:      Cervical back: Normal range of motion and neck supple. No muscular tenderness. Lumbar back: Negative right straight leg raise test and negative left straight leg raise test.      Right lower leg: No edema. Left lower leg: No edema.       Comments: 4/5 strength in LUE with  strength  Neg Tinnels sign  3/5 LLE strength, 5/5 RLE  Positive Rosie test to left lower extremity     Lymphadenopathy:      Cervical: No cervical adenopathy. Skin:     General: Skin is warm and dry. Neurological:      Mental Status: He is alert and oriented to person, place, and time. Cranial Nerves: No facial asymmetry. Motor: No abnormal muscle tone. Coordination: Coordination normal.      Gait: Gait normal.      Comments: No foot drop   Psychiatric:         Mood and Affect: Mood is anxious. Affect is not inappropriate. Speech: Speech normal.         Behavior: Behavior normal. Behavior is cooperative. Thought Content: Thought content normal.         Judgment: Judgment normal.         ASSESSMENT     Diagnosis Orders   1. Lumbar disc disease  [facility]   2. Chronic left-sided low back pain with left-sided sciatica  [facility]   3. Bilateral impacted cerumen            PLAN:  No orders of the defined types were placed in this encounter. 1. This is a patient with longstanding chronic low back pain ongoing for the last 25 years, initially injured in 82 when working as a  and a humvee fell on him. 2. Patient previously with order for MRI, not completed reprinted today. Discussed treatment options, referred to orthopedics in January. Phone number supplied to contact and schedule. Also discussed a course of physical therapy due to limited range of motion and worsening neuropathic pain, patient agreeable. 3. Patient does not wish to adjust gabapentin for pain control at this time  4. Ceruminosis is noted. Wax is removed by syringing and manual debridement. Instructions for home care to prevent wax buildup are given. Patient tolerated procedure, cerumen successfully removed    FOLLOW UP AND INSTRUCTIONS:  No follow-ups on file.     · Loren Horvath received counseling on the following healthy behaviors:exercise    · Discussed use, benefit, and side effects of prescribed medications. Barriers to  medication compliance addressed. All patient questions answered. Pt  verbalized understanding of all instructions given. · Patient given educational materials - see patient instructions      · Patient advised to contact scheduling offices for any referrals or imaging orders  placed today if they have not been contacted in 48 hours. No follow-ups on file. An electronic signature was used to authenticate this note. --ABBY Madrid CNP on 9/1/2021 at 8:48 AM  Visit Information    Have you changed or started any medications since your last visit including any over-the-counter medicines, vitamins, or herbal medicines? no   Are you having any side effects from any of your medications? -  no  Have you stopped taking any of your medications? Is so, why? -  no    Have you seen any other physician or provider since your last visit? No  Have you had any other diagnostic tests since your last visit? No  Have you been seen in the emergency room and/or had an admission to a hospital since we last saw you? No  Have you had your routine dental cleaning in the past 6 months? no    Have you activated your [employer] account? If not, what are your barriers?  Yes     Patient Care Team:  ABBY Madrid CNP as PCP - General (Family Medicine)  ABBY Madrid CNP as PCP - [facility] Empaneled Provider  Alexsandra Schwartz MD as Consulting Physician (Urology)    Medical History Review  Past Medical, Family, and Social History reviewed and does not contribute to the patient presenting condition    Health Maintenance   Topic Date Due    COVID-19 Vaccine (1) Never done    DTaP/Tdap/Td vaccine (1 - Tdap) Never done    Flu vaccine (1) 09/01/2021    Lipid screen  01/20/2026    Hepatitis C screen  Completed    HIV screen  Completed    Hepatitis A vaccine  Aged Out    Hepatitis B vaccine  Aged Out    Hib vaccine  Aged Out    Meningococcal (ACWY) vaccine  Aged Out  Pneumococcal 0-64 years Vaccine  Aged Out

## 2021-09-01 NOTE — PATIENT INSTRUCTIONS
[address] [facility]   [address] [place] [address], [place] [address]   [phone]

## 2021-09-08 ENCOUNTER — TELEPHONE (OUTPATIENT)
Dept: PRIMARY CARE CLINIC | Age: 46
End: 2021-09-08

## 2021-09-08 ENCOUNTER — HOSPITAL ENCOUNTER (OUTPATIENT)
Dept: MRI IMAGING | Facility: CLINIC | Age: 46
Discharge: HOME | End: 2021-09-10
Payer: COMMERCIAL

## 2021-09-08 DIAGNOSIS — M54.42 CHRONIC LEFT-SIDED LOW BACK PAIN WITH LEFT-SIDED SCIATICA: Primary | ICD-10-CM

## 2021-09-08 DIAGNOSIS — G89.29 CHRONIC LEFT-SIDED LOW BACK PAIN WITH LEFT-SIDED SCIATICA: Primary | ICD-10-CM

## 2021-09-08 DIAGNOSIS — M79.2 RADICULAR PAIN OF LEFT UPPER EXTREMITY: ICD-10-CM

## 2021-09-08 DIAGNOSIS — M51.16 LUMBAR DISC HERNIATION WITH RADICULOPATHY: ICD-10-CM

## 2021-09-08 DIAGNOSIS — M48.02 CERVICAL STENOSIS OF SPINE: ICD-10-CM

## 2021-09-08 DIAGNOSIS — M51.9 LUMBAR DISC DISEASE: ICD-10-CM

## 2021-09-08 PROCEDURE — 72148 MRI LUMBAR SPINE W/O DYE: CPT

## 2021-09-08 PROCEDURE — 72141 MRI NECK SPINE W/O DYE: CPT

## 2021-09-15 ENCOUNTER — OFFICE VISIT (OUTPATIENT)
Dept: NEUROLOGY | Age: 46
End: 2021-09-15
Payer: COMMERCIAL

## 2021-09-15 VITALS
SYSTOLIC BLOOD PRESSURE: 134 MMHG | WEIGHT: 192.2 LBS | DIASTOLIC BLOOD PRESSURE: 88 MMHG | HEIGHT: 72 IN | HEART RATE: 57 BPM | BODY MASS INDEX: 26.03 KG/M2

## 2021-09-15 DIAGNOSIS — M79.2 NEUROPATHIC PAIN: ICD-10-CM

## 2021-09-15 DIAGNOSIS — M54.9 INTRACTABLE BACK PAIN: Primary | ICD-10-CM

## 2021-09-15 DIAGNOSIS — G89.29 CHRONIC LEFT-SIDED LOW BACK PAIN WITH LEFT-SIDED SCIATICA: ICD-10-CM

## 2021-09-15 DIAGNOSIS — M54.42 CHRONIC LEFT-SIDED LOW BACK PAIN WITH LEFT-SIDED SCIATICA: ICD-10-CM

## 2021-09-15 DIAGNOSIS — M47.816 LUMBAR SPONDYLOSIS: ICD-10-CM

## 2021-09-15 PROCEDURE — 99214 OFFICE O/P EST MOD 30 MIN: CPT | Performed by: PSYCHIATRY & NEUROLOGY

## 2021-09-15 NOTE — PROGRESS NOTES
NEUROLOGY FOLLOW-UP VISIT   Patient Name:       Frankie Durham  :        1975  Clinic Visit Date:    9/15/2021      Dear Dr. Alfonso Barnett, APRN - CNP       I saw Mr. Frankie Durham in follow-up in the office today in continuation of neurologic care. As you know he  is a 42 y.o. right handed  male initially seen in neurology consultation on 2021 for evaluation of > 20 yr hx of chronic low back pain and neck pain with resultant left upper and left lower extremity weakness. Today is his first follow-up visit. He comes to visit stating that he could not tolerate gabapentin and he stopped taking it. He also could not tolerate baclofen or any other muscle relaxant and it made him extremely sleepy. He does not want to try more medications as \"medications are making me altered, not able to think right and sleepy\". He is very frustrated about ongoing back pain and not getting any relief. During the initial visit on 2021; he endorsed to having chronic low back ache and neck pain for about 25 years. He was hurt at work in 56 and was working as a  and humvee fell on him, knocked him down, hit his head with no LOC. He was taken to [facility] in [place], [place] and he had imaging studies afterwards and also was under care of neurologist in [facility], Li Young and he was told to have \"possible nerve damage in lower back\". Never needed any surgical interventions in the past.  Denied bladder and bowel dysfunction. He stated that he has been having ongoing back pain radiating down left lower extremity and extending to left foot at times. His pain is described as aching pain of moderately severe intensity with fluctuating intensity and severity. He has exacerbation of pain with certain positions and sitting/standing for longer duration makes the pain worse. He also has neck pain radiating down left arm along with numbness and tingling.   He does have intermittent tingling sensation radiating down from back of the neck extending to all of the digits of left hand at times. He does have associated weakness involving left upper and left lower extremities. Denies bladder and bowel dysfunction. He does admit to having balance difficulties with weakness on occasion. Denied falls. He is concerned about weakness in left side of the body. Denies febrile illnesses. Review of systems done by staff reviewed and pertinent positives include Back pain, neck pain, muscle pain with spasms, trouble walking, numbness and weakness. Current Outpatient Medications on File Prior to Visit   Medication Sig Dispense Refill    tadalafil (CIALIS) 20 MG tablet TAKE ONE TABLET BY MOUTH EVERY DAY AS NEEDED 20 tablet 3    Cholecalciferol (VITAMIN D) 50 MCG (2000 UT) CAPS capsule Take by mouth      baclofen (LIORESAL) 10 MG tablet Take 10 mg by mouth 2 times daily      gabapentin (NEURONTIN) 100 MG capsule Take 1 capsule by mouth 3 times daily for 180 days. Intended supply: 30 days 90 capsule 5    DICLOFENAC PO Take 25 mg by mouth 2 times daily as needed      traMADol (ULTRAM) 50 MG tablet Take 50 mg by mouth every 6 hours as needed for Pain       No current facility-administered medications on file prior to visit. Allergies: Jun Graham has No Known Allergies. Past Medical History:   Diagnosis Date    Arthritis     Caffeine use     1 tea, 2 soda/day    Chronic back pain     Erectile dysfunction        Past Surgical History:   Procedure Laterality Date    VASECTOMY Bilateral 12/21/2016     Social History: Jun Graham  reports that he has never smoked. He has never used smokeless tobacco. He reports current alcohol use. He reports that he does not use drugs. No family history on file. On exam: Vitals: Blood pressure 134/88, pulse 57, height 6' (1.829 m), weight 192 lb 3.2 oz (87.2 kg).     NEUROLOGIC EXAMINATION  GENERAL  Appears comfortable and in no distress   HEENT refer him to pain clinic  Hasn't been taking gabapentin as it made \"mood changed and sleepy\"  Unable to tolerate muscle relaxants because of lethargy  Presently on diclofenac and not getting enough relief with tramadol 50 mg q6hr prn    Twenty five year hx of low back pain and neck pain with resultant left upper and left lower extremity weakness; Left lower lumbar radiculopathy occurring intermittently; MRI cervical spine and MRI lumbar spine images reviewed with the patient. .     Neuropathic pain in left lower extremity; tingling and abnormal sensations in left upper extremity: Does not want any neuropathic pain meds; does not want any muscle relaxants for muscle spasms. Will see him in follow-up in 6 months. Please note that portions of this note were completed with a voice recognition program. Although every effort was made to ensure the accuracy of this automated transcription, some errors in transcription may have occurred; occasionally words are mis-transcribed. Thank you for understanding.

## 2021-09-15 NOTE — PROGRESS NOTES
Constitutional [] Weight loss/gain   [] Fatigue  [] Fever/Chills   HEENT [] Hearing Loss  [] Visual Disturbance  [] Tinnitus  [] Eye pain   Respiratory [] Shortness of Breath  [] Cough  [] Snoring   Cardiovascular [] Chest Pain  [] Palpitations  [] Lightheaded   GI [] Constipation  [] Diarrhea  [] Swallowing change  [] Nausea/vomiting    [] Urinary Frequency  [] Urinary Urgency   Musculoskeletal [x] Neck pain  [x] Back pain  [x] Muscle pain  [] Restless legs   Dermatologic [] Skin changes   Neurologic [] Memory loss/confusion  [] Seizures  [x] Trouble walking or imbalance  [] Dizziness  [] Sleep disturbance  [x] Weakness  [x] Numbness  [] Tremors  [] Speech Difficulty  [] Headaches  [] Light Sensitivity  [] Sound Sensitivity   Endocrinology []Excessive thirst  []Excessive hunger   Psychiatric [] Anxiety/Depression  [] Hallucination   Allergy/immunology []Hives/environmental allergies   Hematologic/lymph [] Abnormal bleeding  [] Abnormal bruising

## 2021-09-20 ENCOUNTER — TELEPHONE (OUTPATIENT)
Dept: PAIN MANAGEMENT | Age: 46
End: 2021-09-20

## 2021-09-20 NOTE — TELEPHONE ENCOUNTER
[facility]. Attempted to outreach pt. To schedule consultation apt., and VM obtained. Writer left YOMAIRA, and will attempted to outreach pt. Again in the future.

## 2022-02-10 ENCOUNTER — TELEPHONE (OUTPATIENT)
Dept: PRIMARY CARE CLINIC | Age: 47
End: 2022-02-10

## 2022-02-10 NOTE — TELEPHONE ENCOUNTER
Patient called to inquire about reason that tadalafil 20 mg med refill request was denied. Patient became \"upset with staff\" when office was trying to explain that appointment maybe needed for future refill, stating \" he is does not have a reason to come to office or be seen by provider and will not schedule appointment in office or virtual\" also stated\" that he will est care with another provider and hung up on staff. \"

## 2022-03-09 RX ORDER — TADALAFIL 20 MG/1
TABLET ORAL
Qty: 20 TABLET | Refills: 3 | Status: SHIPPED | OUTPATIENT
Start: 2022-03-09 | End: 2022-06-17 | Stop reason: SDUPTHER

## 2022-03-09 NOTE — TELEPHONE ENCOUNTER
Health Maintenance   Topic Date Due    COVID-19 Vaccine (1) Never done    DTaP/Tdap/Td vaccine (1 - Tdap) Never done    Flu vaccine (1) 09/01/2021    Depression Screen  06/22/2022    Lipid screen  01/20/2026    Hepatitis C screen  Completed    HIV screen  Completed    Hepatitis A vaccine  Aged Out    Hepatitis B vaccine  Aged Out    Hib vaccine  Aged Out    Meningococcal (ACWY) vaccine  Aged Out    Pneumococcal 0-64 years Vaccine  Aged Out             (applicable per patient's age: Cancer Screenings, Depression Screening, Fall Risk Screening, Immunizations)    LDL Cholesterol (mg/dL)   Date Value   01/20/2021 104     AST (U/L)   Date Value   01/20/2021 30     ALT (U/L)   Date Value   01/20/2021 10     BUN (mg/dL)   Date Value   01/20/2021 24 (H)      (goal A1C is < 7)   (goal LDL is <100) need 30-50% reduction from baseline     BP Readings from Last 3 Encounters:   09/15/21 134/88   09/01/21 122/82   06/22/21 120/76    (goal /80)      All Future Testing planned in [facility]:  Lab Frequency Next Occurrence       Next Visit Date:  No future appointments.          Patient Active Problem List:     Knee pain, left     Lumbar disc disease     Right shoulder pain     Neck pain on right side     Family planning

## 2022-06-16 DIAGNOSIS — Z76.0 MEDICATION REFILL: Primary | ICD-10-CM

## 2022-06-16 RX ORDER — TADALAFIL 20 MG/1
TABLET ORAL
Qty: 20 TABLET | Refills: 3 | OUTPATIENT
Start: 2022-06-16

## 2022-06-16 NOTE — TELEPHONE ENCOUNTER
Pt would like a refill of Cialis sent to pharmacy on record. Writer scheduled pt with PCP for office visit on 6/22/22.

## 2022-06-17 RX ORDER — TADALAFIL 20 MG/1
TABLET ORAL
Qty: 20 TABLET | Refills: 3 | Status: SHIPPED | OUTPATIENT
Start: 2022-06-17 | End: 2022-09-14

## 2022-06-22 ENCOUNTER — OFFICE VISIT (OUTPATIENT)
Dept: PRIMARY CARE CLINIC | Age: 47
End: 2022-06-22
Payer: MEDICAID

## 2022-06-22 VITALS
HEART RATE: 67 BPM | SYSTOLIC BLOOD PRESSURE: 134 MMHG | DIASTOLIC BLOOD PRESSURE: 88 MMHG | WEIGHT: 196.6 LBS | OXYGEN SATURATION: 98 % | TEMPERATURE: 98.4 F | BODY MASS INDEX: 26.66 KG/M2

## 2022-06-22 DIAGNOSIS — M25.562 CHRONIC PAIN OF LEFT KNEE: Primary | ICD-10-CM

## 2022-06-22 DIAGNOSIS — G89.29 CHRONIC PAIN OF LEFT KNEE: Primary | ICD-10-CM

## 2022-06-22 DIAGNOSIS — Z12.11 COLON CANCER SCREENING: ICD-10-CM

## 2022-06-22 PROCEDURE — 99213 OFFICE O/P EST LOW 20 MIN: CPT | Performed by: NURSE PRACTITIONER

## 2022-06-22 ASSESSMENT — PATIENT HEALTH QUESTIONNAIRE - PHQ9
SUM OF ALL RESPONSES TO PHQ QUESTIONS 1-9: 0
1. LITTLE INTEREST OR PLEASURE IN DOING THINGS: 0
SUM OF ALL RESPONSES TO PHQ9 QUESTIONS 1 & 2: 0
SUM OF ALL RESPONSES TO PHQ QUESTIONS 1-9: 0
2. FEELING DOWN, DEPRESSED OR HOPELESS: 0

## 2022-06-22 ASSESSMENT — ENCOUNTER SYMPTOMS: BACK PAIN: 1

## 2022-06-22 NOTE — PATIENT INSTRUCTIONS
[facility] Charlene Vazquez, Neurosurgery   [address] [place] [address] Drive, [address], [facility], [address] [place] [address]   [phone]

## 2022-06-22 NOTE — PROGRESS NOTES
[facility]  [id number] [facility] [address]  Dept: [phone]  Dept Fax: [phone]    Patient Care Team:  ABBY Dobson CNP as PCP - General (Family Medicine)  ABBY Dobson CNP as PCP - [facility] Empaneled Provider  Brodie Cain MD as Consulting Physician (Urology)    2022     Lashaun Don (:  )DA a 41 y.o. male, here for evaluation of the following medical concerns:   Chief Complaint   Patient presents with    Knee Pain     LLE, throbbing to sharp       Having worsening left knee pain. Often has chronic low back pain with pain rating down his left leg. Hx of tear in his right thigh muscle, told it was from bone calcifications and that it was also present in his left knee at that time  Symptoms in left knee are similar to previous right knee prior to the tear    Vinay Law feels this is significantly impacting his daily activities, achy and uncomfortable to walk after about half a mile. .    Review of Systems   Musculoskeletal: Positive for arthralgias and back pain. Negative for joint swelling. Skin: Negative for wound. Prior to Visit Medications    Medication Sig Taking? Authorizing Provider   tadalafil (CIALIS) 20 MG tablet 1 tablet by mouth daily as needed before intercourse Yes ABBY Dobson CNP   Cholecalciferol (VITAMIN D) 50 MCG ( UT) CAPS capsule Take by mouth Yes Historical Provider, MD   baclofen (LIORESAL) 10 MG tablet Take 10 mg by mouth 2 times daily Yes Historical Provider, MD   gabapentin (NEURONTIN) 100 MG capsule Take 1 capsule by mouth 3 times daily for 180 days.  Intended supply: 30 days Yes Devora Tyler MD   DICLOFENAC PO Take 25 mg by mouth 2 times daily as needed Yes Historical Provider, MD   traMADol (ULTRAM) 50 MG tablet Take 50 mg by mouth every 6 hours as needed for Pain Yes Historical Provider, MD        Social History     Tobacco Use    Smoking status: Never Smoker    Smokeless tobacco: Never Used   Substance Use Topics    Alcohol use: Yes     Comment: 5 week        Vitals:    06/22/22 1024 06/22/22 1100   BP: (!) 132/90 134/88   Site: Left Upper Arm    Position: Sitting    Pulse: 69 67   Temp: 98.4 °F (36.9 °C)    TempSrc: Infrared    SpO2: 98%    Weight: 196 lb 9.6 oz (89.2 kg)      Estimated body mass index is 26.66 kg/m² as calculated from the following:    Height as of 9/15/21: 6' (1.829 m). Weight as of this encounter: 196 lb 9.6 oz (89.2 kg). DIAGNOSTIC FINDINGS:  CBC:  Lab Results   Component Value Date    WBC 6.2 04/26/2019    HGB 13.6 04/26/2019     04/26/2019       BMP:    Lab Results   Component Value Date     01/20/2021    K 4.0 01/20/2021     01/20/2021    CO2 26 01/20/2021    BUN 24 01/20/2021    CREATININE 1.10 01/20/2021    GLUCOSE 92 02/26/2020       HEMOGLOBIN A1C: No results found for: LABA1C    FASTING LIPID PANEL:  Lab Results   Component Value Date    HDL 66 01/20/2021       Physical Exam  Vitals and nursing note reviewed. Constitutional:       General: He is not in acute distress. Appearance: He is well-developed. HENT:      Head: Normocephalic. Right Ear: External ear normal.      Left Ear: External ear normal.      Mouth/Throat:      Mouth: Mucous membranes are moist.   Eyes:      General: No scleral icterus. Pupils: Pupils are equal, round, and reactive to light. Cardiovascular:      Rate and Rhythm: Normal rate and regular rhythm. Pulmonary:      Effort: Pulmonary effort is normal.      Breath sounds: Normal breath sounds. Abdominal:      Palpations: Abdomen is soft. Musculoskeletal:      Cervical back: Normal range of motion and neck supple. No muscular tenderness. Right knee: No effusion. No tenderness. Left knee: No swelling or effusion. Tenderness present. Right lower leg: No edema. Left lower leg: No edema. Legs:       Comments: 4/5 strength in LUE with  strength  Neg Tinnels sign  3/5 LLE strength, 5/5 RLE   Lymphadenopathy:      Cervical: No cervical adenopathy. Skin:     General: Skin is warm and dry. Neurological:      Mental Status: He is alert and oriented to person, place, and time. Cranial Nerves: No facial asymmetry. Motor: No abnormal muscle tone. Coordination: Coordination normal.      Gait: Gait normal.      Comments: No foot drop   Psychiatric:         Mood and Affect: Mood is anxious. Affect is not inappropriate. Speech: Speech normal.         Behavior: Behavior normal. Behavior is cooperative. Thought Content: Thought content normal.         Judgment: Judgment normal.         ASSESSMENT     Diagnosis Orders   1. Chronic pain of left knee  [facility]   2. [facility]          PLAN:  No orders of the defined types were placed in this encounter. 1. Orthopedics referral today for further evaluation of worsening left knee pain. 2. Patient is now 41years old, reviewed recommendations for colon cancer screening. No personal family history of colon cancer, however patient is agreeable to colonoscopy screening at this time. FOLLOW UP AND INSTRUCTIONS:  Return in about 6 months (around 12/22/2022) for follow. · Warren Pop received counseling on the following healthy behaviors:medication adherence    · Discussed use, benefit, and side effects of prescribed medications. Barriers to  medication compliance addressed. All patient questions answered. Pt  verbalized understanding of all instructions given. · Patient given educational materials - see patient instructions      · Patient advised to contact scheduling offices for any referrals or imaging orders  placed today if they have not been contacted in 48 hours.          Return in about 6 months (around 12/22/2022) for follow. An electronic signature was used to authenticate this note. --ABBY Hurd CNP on 6/22/2022 at 11:40 AM  Visit Information    Have you changed or started any medications since your last visit including any over-the-counter medicines, vitamins, or herbal medicines? no   Are you having any side effects from any of your medications? -  no  Have you stopped taking any of your medications? Is so, why? -  no    Have you seen any other physician or provider since your last visit? No  Have you had any other diagnostic tests since your last visit? No  Have you been seen in the emergency room and/or had an admission to a hospital since we last saw you? No  Have you had your routine dental cleaning in the past 6 months? no    Have you activated your [employer] account? If not, what are your barriers?  Yes     Patient Care Team:  ABBY Hurd CNP as PCP - General (Family Medicine)  ABBY Hurd CNP as PCP - [facility] Empaneled Provider  Kisha Gutierrez MD as Consulting Physician (Urology)    Medical History Review  Past Medical, Family, and Social History reviewed and does not contribute to the patient presenting condition    Health Maintenance   Topic Date Due    COVID-19 Vaccine (1) Never done    DTaP/Tdap/Td vaccine (1 - Tdap) Never done    Colorectal Cancer Screen  06/15/2022    Depression Screen  06/22/2022    Flu vaccine (Season Ended) 09/01/2022    Lipids  01/20/2026    Hepatitis C screen  Completed    HIV screen  Completed    Hepatitis A vaccine  Aged Out    Hepatitis B vaccine  Aged Out    Hib vaccine  Aged Out    Meningococcal (ACWY) vaccine  Aged Out    Pneumococcal 0-64 years Vaccine  Aged Out

## 2022-07-06 ENCOUNTER — TELEPHONE (OUTPATIENT)
Dept: GASTROENTEROLOGY | Age: 47
End: 2022-07-06

## 2022-07-06 DIAGNOSIS — Z12.11 SCREENING FOR COLON CANCER: Primary | ICD-10-CM

## 2022-07-06 RX ORDER — POLYETHYLENE GLYCOL 3350 17 G/17G
POWDER, FOR SOLUTION ORAL
Qty: 238 G | Refills: 0 | Status: SHIPPED | OUTPATIENT
Start: 2022-07-06

## 2022-07-06 RX ORDER — BISACODYL 5 MG
TABLET, DELAYED RELEASE (ENTERIC COATED) ORAL
Qty: 4 TABLET | Refills: 0 | Status: SHIPPED | OUTPATIENT
Start: 2022-07-06

## 2022-07-06 NOTE — TELEPHONE ENCOUNTER
Referral on file. Pt is not est with this office. Writer called and spoke to pt. Colon screen questionnaire completed and pt ok to schedule w/o ov. Pt now scheduledSTA colon screen MICHELE Resendez 9/29/22 @ 830am [facility]. Reviewed bowel prep instructions with patient over phone and mailed to home address.

## 2022-07-11 ENCOUNTER — OFFICE VISIT (OUTPATIENT)
Dept: ORTHOPEDIC SURGERY | Age: 47
End: 2022-07-11
Payer: MEDICAID

## 2022-07-11 VITALS — WEIGHT: 196 LBS | HEIGHT: 72 IN | BODY MASS INDEX: 26.55 KG/M2

## 2022-07-11 DIAGNOSIS — S76.112A STRAIN OF LEFT QUADRICEPS TENDON, INITIAL ENCOUNTER: ICD-10-CM

## 2022-07-11 DIAGNOSIS — M76.899 ENTHESOPATHY OF KNEE: ICD-10-CM

## 2022-07-11 DIAGNOSIS — G89.29 CHRONIC PAIN OF LEFT KNEE: Primary | ICD-10-CM

## 2022-07-11 DIAGNOSIS — M25.562 CHRONIC PAIN OF LEFT KNEE: Primary | ICD-10-CM

## 2022-07-11 PROCEDURE — 99204 OFFICE O/P NEW MOD 45 MIN: CPT | Performed by: PHYSICIAN ASSISTANT

## 2022-07-11 NOTE — PROGRESS NOTES
[facility], [address] [place] [address] [address]               [place], Rosey Melo [address].           Dept Phone: [phone]           Dept Fax:  [phone] [facility]           Rica Clark          Dept Phone: [phone]           Dept Fax:  [phone]      Chief Compliant:  Chief Complaint   Patient presents with    Pain     left knee        History of Present Illness: This is a 41 y.o. male who presents to the clinic today for evaluation of had concerns including Pain (left knee). Mr. Cal Nicole is a 61-year-old gentleman who presents today for evaluation of multiple year history of left knee pain. Patient localizes pain most severely to the anterior superior portion of the left knee pointing to the distal quadriceps tendon the proximal patella. Patient reports similar issue on the right knee approximately 7 to 8 years ago stating he was found to have \"calcification\" to the quadricep muscle which resulted in eventual quadriceps tendon rupture which was repaired surgically on the right side at [address] [place] [address],[address] 8 years ago. At the time of that injury to the right knee patient reports he did have an MRI of the left knee and was told he had similar calcification of the left quadriceps tendon but no tearing at that time. Patient reports pain has progressively worsened in the left knee over the last several years and he is concerned that he may develop a similar injury on the left side that he had to the right side. More recently patient reports he did complete physical therapy about 6 months ago with minimal improvement of pain. He is also tried bracing, Aleve and ibuprofen with minimal improvement. Patient reports pain seems to be aggravated by standing or walking for any extended period of time.   Patient denies any new injury or trauma no knee joint Activity:     Days of Exercise per Week: Not on file    Minutes of Exercise per Session: Not on file   Stress:     Feeling of Stress : Not on file   Social Connections:     Frequency of Communication with Friends and Family: Not on file    Frequency of Social Gatherings with Friends and Family: Not on file    Attends Judaism Services: Not on file    Active Member of [address] [place] [address] or Organizations: Not on file    Attends Club or Organization Meetings: Not on file    Marital Status: Not on file   Intimate Partner Violence:     Fear of Current or Ex-Partner: Not on file    Emotionally Abused: Not on file    Physically Abused: Not on file    Sexually Abused: Not on file   Housing Stability:     Unable to Pay for Housing in the Last Year: Not on file    Number of [place] in the Last Year: Not on file    Unstable Housing in the Last Year: Not on file     Past Medical History:   Diagnosis Date    Arthritis     Caffeine use     1 tea, 2 soda/day    Chronic back pain     Erectile dysfunction      Past Surgical History:   Procedure Laterality Date    VASECTOMY Bilateral 12/21/2016     No family history on file. Review of Systems   Constitutional: Negative for fever, chills, sweats. Eyes: Negative for changes in vision, or pain. HENT: Negative for ear ache, epistaxis, or sore throat. Respiratory/Cardio: Negative for Chest pain, palpitations, SOB, or cough. Gastrointestinal: Negative for abdominal pain, N/V/D. Genitourinary: Negative for dysuria, frequency, urgency, or hematuria. Neurological: Negative for headache, numbness, or weakness. Integumentary: Negative for rash, itching, laceration, or abrasion. Musculoskeletal: Positive for Pain (left knee)       Physical Exam:  Constitutional: Patient is oriented to person, place, and time. Patient appears well-developed and well nourished.    HENT: Negative otherwise noted  Head: Normocephalic and Atraumatic  Nose: Normal  Eyes: Conjunctivae and EOM are normal  Neck: Normal range of motion Neck supple. Respiratory/Cardio: Effort normal. No respiratory distress. Musculoskeletal:    Left Knee:     Skin: warm and dry, no rash or erythema  Vasculature: 2+ pedal pulses bilaterally  Neuro: Sensation grossly intact to light touch diffusely  Alignment: Normal  Tenderness: Severe tenderness to the distal quadriceps tendon the knee superior patella. No palpable defect. No tenderness to the patellar tendon or either joint line. No tenderness to  pes anserine bursa or posterior knee. Effusion: Trace    ROM: (Degrees)       A P       Extension  -3 0       Flexion   115 115       Crepitation  Yes       Muscle strength:         Flexion   5      Extension  4      SLR   4        Extensor lag   y          Special testing:  y    Pain with deep knee flexion     n    Patellar grind       y    Patellar apprehension      n    Patellar glide         n    Lachman       n    Anterior drawer      n    Pivot shift       n    Posterior drawer      n    Dial test       n    Posterolateral drawer      n    Posterior Sag       n    MCL        n    LCL          n    Medial joint line tenderness     n    Lateral joint line tenderness     n    McMurrey's         Neurological: Patient is alert and oriented to person, place, and time. Normal strenght. No sensory deficit. Skin: Skin is warm and dry  Psychiatric: Behavior is normal. Thought content normal.  Nursing note and vitals reviewed. Labs and Imaging:      X-rays taken in clinic today and preliminarily reviewed by me 7/11/22:  AP bilateral knees standing lateral view of the left knee demonstrate enthesopathy of distal quadriceps tendon with significant calcification and quad thickening. No evidence of significant degenerative changes tricompartmentally. Small suprapatellar joint effusion. No evidence of acute fracture.        Orders Placed This Encounter   Procedures    XR KNEE LEFT (1-2 VIEWS)     Standing Status: Future     Number of Occurrences:   1     Standing Expiration Date:   7/11/2023    MRI KNEE LEFT WO CONTRAST     Standing Status:   Future     Standing Expiration Date:   7/11/2023       Assessment and Plan:  1. Chronic pain of left knee    2. Enthesopathy of knee    3. Strain of left quadriceps tendon, initial encounter          PLAN:  Regina Rubin is a 41 y.o. old male who presents to the clinic today for evaluation of chronic left knee pain concerning for quadriceps tendinitis likely due to enthesopathy of the quadriceps tendon distally. Considered in the differential include distal quad tendon tear, collateral/cruciate ligament sprain versus tear, meniscal injury, septic joint and fracture. There is no evidence of fracture or degenerative changes on radiographs today. No evidence of ligamentous laxity or meniscal pathology on exam.  Pain is most consistent with patellofemoral pain syndrome. 1. We discussed treatment options available to him, including nonoperative and operative intervention. 2. Patient has failed physical therapy, bracing and multiple over-the-counter medications. 3. Consequently, recommend further diagnostic valuation with an MRI of the left knee given failure of conservative management and concern for developing injury to the quadriceps tendon as patient has similar problem on the right side that resulted in a quadriceps rupture. 4. Patient is educated that based on MRI results we will refer him to one of our sports medicine specialist for further evaluation and discussion of long-term management whether be continued nonoperative or discussion of operative interventions. Patient reports he did have the surgery at [place] [address] and would be happy to go back but does not recall the specific surgeon that did the procedure.   5. I'll see him back my clinic upon completion of MRI but he was encouraged to return or call earlier with questions and/or concerns. Electronically signed by TAHIR Fermin on 7/11/22 at 9:24 AM EDT        Please note that this chart was generated using voice recognition Dragon dictation software. Although every effort was made to ensure the accuracy of this automated transcription, some errors in transcription may have occurred.

## 2022-07-28 ENCOUNTER — HOSPITAL ENCOUNTER (OUTPATIENT)
Dept: MRI IMAGING | Age: 47
Discharge: HOME | End: 2022-07-30
Payer: MEDICAID

## 2022-07-28 DIAGNOSIS — M25.562 CHRONIC PAIN OF LEFT KNEE: ICD-10-CM

## 2022-07-28 DIAGNOSIS — G89.29 CHRONIC PAIN OF LEFT KNEE: ICD-10-CM

## 2022-07-28 PROCEDURE — 73721 MRI JNT OF LWR EXTRE W/O DYE: CPT

## 2022-08-01 ENCOUNTER — TELEPHONE (OUTPATIENT)
Dept: ORTHOPEDIC SURGERY | Age: 47
End: 2022-08-01

## 2022-08-01 DIAGNOSIS — S76.112A STRAIN OF LEFT QUADRICEPS TENDON, INITIAL ENCOUNTER: Primary | ICD-10-CM

## 2022-08-01 NOTE — TELEPHONE ENCOUNTER
Patient LVM to return call with results      ----- Message from [place], [place] sent at 8/1/2022  1:08 PM EDT -----  Partial tearing of the distal quadriceps tendon, this is chronic in nature. Tendonosis on top of this partial tearing. Recommend follow up with sports medicine surgeon at [address] [place] [address],[address] where patient had surgery on the contralateral knee for further evaluation. In the meantime would recommend hinge knee bracing and activity as tolerated.

## 2022-08-01 NOTE — TELEPHONE ENCOUNTER
Patient states that it will very hard to get in with [address] [place] [address],[address] being that it was taken care of by the [place]. He would like to know if you recommend anyone locally, if not can the referral be placed for him to be seen by the sports medicine surgeon at [facility]?

## 2022-08-04 NOTE — TELEPHONE ENCOUNTER
Dr. Yas Helms office called and they do not take straight medicaid insurance. Called the pt to get the name of the surgeon at [facility] but he did not know their name. Cyndi Erm, do you recommend a referral to another physician locally? I also advised the pt to contact his insurance to see who they cover in the area.

## 2022-09-07 ENCOUNTER — OFFICE VISIT (OUTPATIENT)
Dept: ORTHOPEDIC SURGERY | Age: 47
End: 2022-09-07
Payer: MEDICAID

## 2022-09-07 VITALS — WEIGHT: 196 LBS | HEIGHT: 72 IN | BODY MASS INDEX: 26.55 KG/M2 | RESPIRATION RATE: 16 BRPM | OXYGEN SATURATION: 100 %

## 2022-09-07 DIAGNOSIS — M76.899 QUADRICEPS TENDONITIS: Primary | ICD-10-CM

## 2022-09-07 PROCEDURE — 99213 OFFICE O/P EST LOW 20 MIN: CPT | Performed by: ORTHOPAEDIC SURGERY

## 2022-09-07 NOTE — PROGRESS NOTES
[address] [place] [address] AND SPORTS MEDICINE  [facility]  [address] [address]  Dept: [phone]  Dept Fax: [phone]        Orthopaedic Clinic Follow Up      Subjective:   Mr. Shantanu Marrufo is a 68-year-old gentleman who presents today for evaluation of 8+ year history of left knee pain. Patient localizes pain most severely to the anterior superior portion of the left knee pointing to the distal quadriceps tendon the proximal patella. He had a right quad tendon rupture, repaired at [address] [place] [address] 8 years ago, with noted calcifications of his left quad tendon at the time on MRI. Pain has worsened since then. He is concerned he will rupture the left quad tendon, and is seeking treatment that may avoid the long surgical recovery from quad repair. He recently completed physical therapy about 6 months ago with minimal improvement of pain. He is also tried bracing, Aleve and ibuprofen with minimal improvement. Patient has significant pain with walking 10-15 minutes, and marked weakness with jumping and leg extensions compared to the contralateral extremity. Patient denies any new injury or trauma no knee joint warmth, redness, fever or chills. Review of Systems  Gen: no fever, chills, malaise  CV: no chest pain or palpitations  Resp: no cough or shortness of breath  GI: no nausea, vomiting, diarrhea, or constipation  Neuro: no seizures, vertigo, or headache  Msk: See HPI  10 remaining systems reviewed and negative    Objective :     Vitals:    09/07/22 1423   Resp: 16   SpO2: 100%   Body mass index is 26.58 kg/m². General: No acute distress, resting comfortably in the clinic  Neuro: alert. oriented  Eyes: Extra-ocular muscles intact  Pulm: Respirations unlabored and regular. Skin: warm, well perfused  Psych:   Patient has good fund of knowledge and displays understanding of exam, diagnosis, and plan.   MSK:    LLE: No obvious deformity on examination. Patient demonstrates tenderness that is most significant at proximal pole of the patella. Patient has mild pain with patellar grind which is different from his usual pain. Quad tone is decreased compared to the contralateral extremity. Non tender to palpation at medial/lateral joint lines. 1/4 quadrant of patellar excursion medially and laterally, slight exacerabation of proximal pole patellar pain. Pain at proximal pole of patella with SLR. Negative Kishor's exam. Lachman 1a, no excess mobility with varus/valgus stress at 0 and 30 degrees. Neurovascularly intact distally. Radiology:  No new images obtained today. Previous images and MRI reviewed. MRI demonstrates worsening of distal quad tendonitis. Assessment:   41y.o. year old male being seen for:    Left     ICD-10-CM    1. Quadriceps tendonitis  M76.899           Plan:   -Patient has nearly exhausted nonoperative treatment modalities.  -Injection was offered into left quad tendon, with associated risks including increased risk of rerupture.  -Surgical quad debridement and repair was also discussed  -Patient elects to defer injection/surgery, and will call if he is ready to pursue either. Would like time to think about it as he is currently in an orientation for a job he would really like to keep. Follow up:Return if symptoms worsen or fail to improve. No orders of the defined types were placed in this encounter. No orders of the defined types were placed in this encounter. Electronically signed by Venkatesh Justin MD PGY-3 on 9/7/2022 at 3:28 PM    This note is created with the assistance of a speech recognition program.  While intending to generate a document that actually reflects the content of the visit, the document can still have some errors including those of syntax and sound a like substitutions which may escape proof reading.   In such instances, actual meaning can be extrapolated by contextual diversion

## 2022-09-13 DIAGNOSIS — Z76.0 MEDICATION REFILL: ICD-10-CM

## 2022-09-14 RX ORDER — TADALAFIL 20 MG/1
TABLET ORAL
Qty: 20 TABLET | Refills: 3 | Status: SHIPPED | OUTPATIENT
Start: 2022-09-14

## 2022-09-15 ENCOUNTER — OFFICE VISIT (OUTPATIENT)
Dept: ORTHOPEDIC SURGERY | Age: 47
End: 2022-09-15
Payer: MEDICAID

## 2022-09-15 VITALS — WEIGHT: 203 LBS | BODY MASS INDEX: 27.5 KG/M2 | OXYGEN SATURATION: 98 % | HEIGHT: 72 IN

## 2022-09-15 DIAGNOSIS — M76.899 QUADRICEPS TENDONITIS: Primary | ICD-10-CM

## 2022-09-15 PROCEDURE — 99213 OFFICE O/P EST LOW 20 MIN: CPT | Performed by: ORTHOPAEDIC SURGERY

## 2022-09-15 ASSESSMENT — ENCOUNTER SYMPTOMS
EYE DISCHARGE: 0
ROS SKIN COMMENTS: NEGATIVE FOR RASH
ABDOMINAL PAIN: 0
SHORTNESS OF BREATH: 0

## 2022-09-15 NOTE — PROGRESS NOTES
[address] [facility] SPORTS [facility]  [address] [address] [place] [address] [address] [address]  Dept: [phone]  Dept Fax: [phone]        Ambulatory Follow Up      Subjective:   Darius Martines is a 41y.o. year old male who presents to our office today for routine followup regarding his   1. Quadriceps tendonitis    . Chief Complaint   Patient presents with    Knee Pain     L knee review MRI results       HPI   Juni Molina is a 82-year-old male who presents the office today for recheck of quadriceps partial tear versus severe tendinitis. He was last seen in the office on 9/7/2022. He was considering between the 2 options for surgical repair or potential corticosteroid injection. The patient presents the office today wanting the corticosteroid injection. He is unable to take time off of work right now as he is in school at [employer] for first [employer] and he is very active. Review of Systems   Constitutional:  Positive for activity change. Negative for fever. HENT:  Negative for dental problem. Eyes:  Negative for discharge. Respiratory:  Negative for shortness of breath. Cardiovascular:  Negative for chest pain. Gastrointestinal:  Negative for abdominal pain. Genitourinary: Negative. Musculoskeletal:  Positive for arthralgias. Skin:         Negative for rash   Neurological:  Positive for weakness. Psychiatric/Behavioral:  Negative for confusion. I have reviewed the CC, HPI, ROS, PMH, FHX, Social History, and if not present in this note, I have reviewed in the patient's chart. I agree with the documentation provided by other staff and have reviewed their documentation prior to providing my signature indicating agreement. Objective :   Ht 6' (1.829 m)   Wt 203 lb (92.1 kg)   SpO2 98%   BMI 27.53 kg/m²  Body mass index is 27.53 kg/m².   General: Darius Martines is a 41 y.o. male who is alert and oriented and sitting comfortably in our office. Ortho Exam  MS: Evaluation of the left knee reveals full range of motion of the left knee and nearly full quad strength on the left. Tenderness at the superior lateral aspect of the quadriceps insertion of the superolateral patella is noted. Motor, sensory, vascular examination to the left lower extremity is grossly intact without focal deficits and the patient ambulates without antalgia. Neuro: alert and oriented to person and place. Eyes: Extra-ocular muscles intact  Mouth: Oral mucosa moist. No perioral lesions  Pulm: Respirations unlabored and regular. Symmetric chest excursion without outward deformity is noted. Skin: warm, well perfused  Psych:   Patient has good fund of knowledge and displays understanging of exam, diagnosis, and plan. Radiology: No x-rays were taken in office today. Procedure: After informed consent obtained verbally, the superior lateral aspect of the patella was locally prepped with Betadine and locally anesthetized with ethyl chloride spray. That area at the quadriceps insertion over the superolateral patella was injected with a mixture of 2.25% Marcaine plain and 80 mg of Depo-Medrol. The patient tolerated the procedure well without post procedure complications. Assessment:      1. Quadriceps tendonitis       Plan:      Injection for quadriceps tendinitis versus partial tear was done today and the patient tolerated that well without any significant complications to the left knee. I plan to see him back as needed. Follow up:No follow-ups on file. No orders of the defined types were placed in this encounter. No orders of the defined types were placed in this encounter.       This note is created with the assistance of a speech recognition program.  While intending to generate a document that actually reflects the content of the visit, the document can still have some errors including those of syntax and sound a like substitutions which may escape proof reading.   In such instances, actual meaning can be extrapolated by contextual diversion      Electronically signed by Ronak Espinoza DO, Tilden Going on 9/15/2022 at 2:08 PM

## 2022-09-29 RX ORDER — POLYETHYLENE GLYCOL 3350 17 G/17G
238 POWDER, FOR SOLUTION ORAL ONCE
Qty: 238 G | Refills: 0 | Status: SHIPPED | OUTPATIENT
Start: 2022-09-29 | End: 2022-09-29

## 2022-09-29 RX ORDER — BISACODYL 5 MG
5 TABLET, DELAYED RELEASE (ENTERIC COATED) ORAL DAILY PRN
Qty: 4 TABLET | Refills: 0 | Status: SHIPPED | OUTPATIENT
Start: 2022-09-29 | End: 2022-09-30

## 2022-10-07 ENCOUNTER — OFFICE VISIT (OUTPATIENT)
Dept: PRIMARY CARE CLINIC | Age: 47
End: 2022-10-07
Payer: MEDICAID

## 2022-10-07 VITALS
HEART RATE: 71 BPM | TEMPERATURE: 98.3 F | BODY MASS INDEX: 27.67 KG/M2 | SYSTOLIC BLOOD PRESSURE: 118 MMHG | OXYGEN SATURATION: 98 % | WEIGHT: 204 LBS | DIASTOLIC BLOOD PRESSURE: 80 MMHG

## 2022-10-07 DIAGNOSIS — M25.561 CHRONIC PAIN OF RIGHT KNEE: Primary | ICD-10-CM

## 2022-10-07 DIAGNOSIS — Z23 NEED FOR INFLUENZA VACCINATION: ICD-10-CM

## 2022-10-07 DIAGNOSIS — G89.29 CHRONIC PAIN OF RIGHT KNEE: Primary | ICD-10-CM

## 2022-10-07 DIAGNOSIS — H61.23 BILATERAL IMPACTED CERUMEN: ICD-10-CM

## 2022-10-07 PROCEDURE — 90674 CCIIV4 VAC NO PRSV 0.5 ML IM: CPT | Performed by: NURSE PRACTITIONER

## 2022-10-07 PROCEDURE — 69210 REMOVE IMPACTED EAR WAX UNI: CPT | Performed by: NURSE PRACTITIONER

## 2022-10-07 PROCEDURE — 90471 IMMUNIZATION ADMIN: CPT | Performed by: NURSE PRACTITIONER

## 2022-10-07 PROCEDURE — 99214 OFFICE O/P EST MOD 30 MIN: CPT | Performed by: NURSE PRACTITIONER

## 2022-10-07 SDOH — ECONOMIC STABILITY: FOOD INSECURITY: WITHIN THE PAST 12 MONTHS, THE FOOD YOU BOUGHT JUST DIDN'T LAST AND YOU DIDN'T HAVE MONEY TO GET MORE.: NEVER TRUE

## 2022-10-07 SDOH — ECONOMIC STABILITY: TRANSPORTATION INSECURITY
IN THE PAST 12 MONTHS, HAS THE LACK OF TRANSPORTATION KEPT YOU FROM MEDICAL APPOINTMENTS OR FROM GETTING MEDICATIONS?: NO

## 2022-10-07 SDOH — ECONOMIC STABILITY: FOOD INSECURITY: WITHIN THE PAST 12 MONTHS, YOU WORRIED THAT YOUR FOOD WOULD RUN OUT BEFORE YOU GOT MONEY TO BUY MORE.: NEVER TRUE

## 2022-10-07 SDOH — ECONOMIC STABILITY: TRANSPORTATION INSECURITY
IN THE PAST 12 MONTHS, HAS LACK OF TRANSPORTATION KEPT YOU FROM MEETINGS, WORK, OR FROM GETTING THINGS NEEDED FOR DAILY LIVING?: NO

## 2022-10-07 ASSESSMENT — SOCIAL DETERMINANTS OF HEALTH (SDOH): HOW HARD IS IT FOR YOU TO PAY FOR THE VERY BASICS LIKE FOOD, HOUSING, MEDICAL CARE, AND HEATING?: NOT HARD AT ALL

## 2022-10-07 ASSESSMENT — ENCOUNTER SYMPTOMS
BACK PAIN: 1
COLOR CHANGE: 0

## 2022-10-07 NOTE — PROGRESS NOTES
[facility]  [address] [facility] [address]  Dept: [phone]  Dept Fax: [phone]    Patient Care Team:  ABBY Garcia CNP as PCP - General (Family Medicine)  ABBY Garcia CNP as PCP - REHABILITATION [facility] Empaneled Provider  Mine Tam MD as Consulting Physician (Urology)    10/7/2022     Roderick Cardoza (:  [birth year])NT a 41 y.o. male, here for evaluation of the following medical concerns:   Chief Complaint   Patient presents with    Knee Pain     RLE, Throbbing    Joint Swelling    Flu Vaccine     Discuss       Having worsening right knee pain. Often has chronic low back pain with pain rating down his left leg. Hx of tear in his right thigh muscle, told it was from bone calcifications and that it was also present in his left knee at that time. Did have a shot in his left knee last month, which has helped. Symptoms in right knee are similar to previous, becoming more persistent  Macebonie Huertas states he started having some swelling along the lower end of his kneecap about a week ago, where he typically has pain at the lower end of his quadricep muscle and right above the kneecap    Macebonie Huertas feels this is significantly impacting his daily activities, achy and uncomfortable to walk after about half a mile. .    Review of Systems   Constitutional:  Negative for chills and fever. HENT:  Positive for hearing loss. Cardiovascular:  Negative for leg swelling. Musculoskeletal:  Positive for arthralgias, back pain and joint swelling. Skin:  Negative for color change and wound. Prior to Visit Medications    Medication Sig Taking?  Authorizing Provider   diclofenac sodium (VOLTAREN) 1 % GEL Apply 4 g topically 4 times daily as needed for Pain Yes ABBY Garcia - CNP   tadalafil (CIALIS) 20 MG tablet TAKE ONE TABLET BY MOUTH EVERY DAY AS NEEDED BEFORE INTERCOURSE Yes ABBY Garcia - CNP   Cholecalciferol (VITAMIN D) 50 MCG (2000 UT) CAPS capsule Take by mouth Yes Historical Provider, MD   baclofen (LIORESAL) 10 MG tablet Take 10 mg by mouth 2 times daily Yes Historical Provider, MD   gabapentin (NEURONTIN) 100 MG capsule Take 1 capsule by mouth 3 times daily for 180 days. Intended supply: 30 days Yes Jessica Hernandez MD   DICLOFENAC PO Take 25 mg by mouth 2 times daily as needed Yes Historical Provider, MD   traMADol (ULTRAM) 50 MG tablet Take 50 mg by mouth every 6 hours as needed for Pain Yes Historical Provider, MD   polyethylene glycol (GLYCOLAX) 17 GM/SCOOP powder Follow Instructions provided by physician's office  Patient not taking: Reported on 10/7/2022  Mildred Hannon MD   bisacodyl (BISACODYL) 5 MG EC tablet Take 4 tablets in the morning  Patient not taking: Reported on 10/7/2022  Mildred Hannon MD        Social History     Tobacco Use    Smoking status: Never    Smokeless tobacco: Never   Substance Use Topics    Alcohol use: Yes     Comment: 5 week        Vitals:    10/07/22 1051   BP: 118/80   Site: Right Upper Arm   Position: Sitting   Pulse: 71   Temp: 98.3 °F (36.8 °C)   TempSrc: Infrared   SpO2: 98%   Weight: 204 lb (92.5 kg)     Estimated body mass index is 27.67 kg/m² as calculated from the following:    Height as of 9/15/22: 6' (1.829 m). Weight as of this encounter: 204 lb (92.5 kg).     DIAGNOSTIC FINDINGS:  CBC:  Lab Results   Component Value Date/Time    WBC 6.2 04/26/2019 08:24 AM    HGB 13.6 04/26/2019 08:24 AM     04/26/2019 08:24 AM       BMP:    Lab Results   Component Value Date/Time     01/20/2021 07:34 AM    K 4.0 01/20/2021 07:34 AM     01/20/2021 07:34 AM    CO2 26 01/20/2021 07:34 AM    BUN 24 01/20/2021 07:34 AM    CREATININE 1.10 01/20/2021 07:34 AM    GLUCOSE 92 02/26/2020 11:20 AM       HEMOGLOBIN A1C: No results found for: LABA1C    FASTING LIPID PANEL:  Lab Results   Component Value Date    HDL 66 01/20/2021       Physical Exam  Constitutional:       Appearance: Normal appearance. He is not ill-appearing. HENT:      Head: Normocephalic. Right Ear: There is impacted cerumen. Left Ear: There is impacted cerumen. Eyes:      General: No scleral icterus. Pupils: Pupils are equal, round, and reactive to light. Pulmonary:      Effort: Pulmonary effort is normal.   Musculoskeletal:      Right knee: Effusion (Small, around distal aspect of the patella.) present. No erythema or ecchymosis. Normal range of motion. Right lower leg: No edema. Neurological:      Mental Status: He is alert. ASSESSMENT     Diagnosis Orders   1. Chronic pain of right knee  XR KNEE RIGHT (3 VIEWS)    diclofenac sodium (VOLTAREN) 1 % GEL    Kenya Fountain DO, Orthopedic Surgery, [place]      2. Need for influenza vaccination  Influenza, FLUCELVAX, (age 10 mo+), IM, Preservative Free, 0.5 mL      3. Bilateral impacted cerumen  73756 - MT REMOVE IMPACTED EAR WAX             PLAN:  Orders Placed This Encounter   Medications    diclofenac sodium (VOLTAREN) 1 % GEL     Sig: Apply 4 g topically 4 times daily as needed for Pain     Dispense:  50 g     Refill:  1         Encouraged Benigno to compress and use ice for the effusion. Place new referral to orthopedics given complaint of right knee pain, however he is established Dr. Nettie Casillas for left knee complaints. Encouraged him to call today. Voltaren gel prescribed for intermittent use for chronic knee pains  Ceruminosis is noted. Wax is removed by syringing and manual debridement. TM visualized after procedure, intact. Instructions for home care to prevent wax buildup are given. FOLLOW UP AND INSTRUCTIONS:  Return if symptoms worsen or fail to improve. Vasyl Cheema received counseling on the following healthy behaviors:medication adherence    Discussed use, benefit, and side effects of prescribed medications. Barriers to  medication compliance addressed.   All patient questions answered. Pt  verbalized understanding of all instructions given. Patient given educational materials - see patient instructions      Patient advised to contact scheduling offices for any referrals or imaging orders  placed today if they have not been contacted in 48 hours. Return if symptoms worsen or fail to improve. An electronic signature was used to authenticate this note. --ABBY Garcia CNP on 10/7/2022 at 11:48 AM  Visit Information    Have you changed or started any medications since your last visit including any over-the-counter medicines, vitamins, or herbal medicines? no   Are you having any side effects from any of your medications? -  no  Have you stopped taking any of your medications? Is so, why? -  no    Have you seen any other physician or provider since your last visit? Yes - Records Obtained  Have you had any other diagnostic tests since your last visit? No  Have you been seen in the emergency room and/or had an admission to a hospital since we last saw you? No  Have you had your routine dental cleaning in the past 6 months? no    Have you activated your [employer] account? If not, what are your barriers?  Yes     Patient Care Team:  ABBY Garcia CNP as PCP - General (Family Medicine)  ABBY Garcia CNP as PCP - [facility] Empaneled Provider  Mine Tam MD as Consulting Physician (Urology)    Medical History Review  Past Medical, Family, and Social History reviewed and does not contribute to the patient presenting condition    Health Maintenance   Topic Date Due    COVID-19 Vaccine (1) Never done    DTaP/Tdap/Td vaccine (1 - Tdap) Never done    Hepatitis B vaccine (3 of 4 - 4-dose series) 03/19/2014    Colorectal Cancer Screen  Never done    Depression Screen  06/22/2023    Lipids  01/20/2026    Flu vaccine  Completed    Hepatitis C screen  Completed    HIV screen  Completed    Hepatitis A vaccine  Aged Out    Hib vaccine  Aged Out Meningococcal (ACWY) vaccine  Aged Out    Pneumococcal 0-64 years Vaccine  Aged Out

## 2022-10-19 ENCOUNTER — TELEPHONE (OUTPATIENT)
Dept: ORTHOPEDIC SURGERY | Age: 47
End: 2022-10-19

## 2022-10-19 DIAGNOSIS — M25.561 RIGHT KNEE PAIN, UNSPECIFIED CHRONICITY: Primary | ICD-10-CM

## 2022-10-19 NOTE — TELEPHONE ENCOUNTER
Pt has appt scheduled with Renata Capone at 3:45pm 10/20/22. Called pt at 2:00pm, pt picked up and asked about getting right knee inj from Dr Chema Matthew, Dr Chema Matthew is out of office, won't be back til 10/26/22. Advised pt that staff will ask and call back. Called pt again at 2:30pm, left msg that Renata Capone has to see pt in person before determining to get inj in right knee. Left msg about appt with Renata Capone and gave pt directions on how to find office.  [facility]

## 2022-11-23 DIAGNOSIS — M25.561 CHRONIC PAIN OF RIGHT KNEE: ICD-10-CM

## 2022-11-23 DIAGNOSIS — G89.29 CHRONIC PAIN OF RIGHT KNEE: ICD-10-CM

## 2022-11-23 NOTE — TELEPHONE ENCOUNTER
Rachel Vick is calling to request a refill on the following medication(s):    Medication Request:  Requested Prescriptions     Pending Prescriptions Disp Refills    diclofenac sodium (VOLTAREN) 1 % GEL [Pharmacy Med Name: diclofenac 1 % topical gel] 100 g 0     Sig: APPLY 4 GRAMS TOPICALLY FOUR TIMES DAILY AS NEEDED FOR PAIN       Last Visit Date (If Applicable):  35/5/4562    Next Visit Date:    12/22/2022

## 2022-12-14 DIAGNOSIS — M25.561 RIGHT KNEE PAIN, UNSPECIFIED CHRONICITY: Primary | ICD-10-CM

## 2022-12-15 ENCOUNTER — OFFICE VISIT (OUTPATIENT)
Dept: ORTHOPEDIC SURGERY | Age: 47
End: 2022-12-15

## 2022-12-15 VITALS — WEIGHT: 204 LBS | BODY MASS INDEX: 27.63 KG/M2 | HEIGHT: 72 IN | OXYGEN SATURATION: 100 % | RESPIRATION RATE: 16 BRPM

## 2022-12-15 DIAGNOSIS — M76.899 QUADRICEPS TENDONITIS: Primary | ICD-10-CM

## 2022-12-15 RX ORDER — METHYLPREDNISOLONE 4 MG/1
TABLET ORAL
Qty: 1 KIT | Refills: 1 | Status: SHIPPED | OUTPATIENT
Start: 2022-12-15 | End: 2022-12-21

## 2022-12-16 DIAGNOSIS — Z76.0 MEDICATION REFILL: ICD-10-CM

## 2022-12-18 ASSESSMENT — ENCOUNTER SYMPTOMS
ROS SKIN COMMENTS: NEGATIVE FOR RASH
ABDOMINAL PAIN: 0
SHORTNESS OF BREATH: 0
EYE DISCHARGE: 0

## 2022-12-18 NOTE — PROGRESS NOTES
[address] [facility] SPORTS [facility]  [address] [address] [place] [address] [place]  [address] [address]  Dept: [phone]  Dept Fax: [phone]        Ambulatory Follow Up      Subjective:   Benito Blankenship is a 41y.o. year old male who presents to our office today for routine followup regarding his   1. Quadriceps tendonitis    . Chief Complaint   Patient presents with    Knee Pain     Right knee pain       HPI  Fariba Cummings is a 51-year-old male who presents the office today for recheck of his bilateral quadriceps tendinitis left greater than right. He notes that the left injection helped for approximately 1 month and was received on 9/15/2022. At that time he felt approximately 50% better. He has had a previous right quadriceps tear with repair and notes that the left feels like the right did prior to the rupture. He notes pain at the left quadriceps over the last 9 to 10 years. The pain level is limiting his usual activities of daily living    Review of Systems   Constitutional:  Positive for activity change. Negative for fever. HENT:  Negative for dental problem. Eyes:  Negative for discharge. Respiratory:  Negative for shortness of breath. Cardiovascular:  Negative for chest pain. Gastrointestinal:  Negative for abdominal pain. Genitourinary: Negative. Musculoskeletal:  Positive for arthralgias. Skin:         Negative for rash   Neurological:  Positive for weakness. Psychiatric/Behavioral:  Negative for confusion. I have reviewed the CC, HPI, ROS, PMH, FHX, Social History, and if not present in this note, I have reviewed in the patient's chart. I agree with the documentation provided by other staff and have reviewed their documentation prior to providing my signature indicating agreement.     Objective :   Resp 16   Ht 6' (1.829 m)   Wt 204 lb (92.5 kg)   SpO2 100%   BMI 27.67 kg/m²  Body mass index is 27.67 kg/m². General: Kayleigh Recinosan is a 41 y.o. male who is alert and oriented and sitting comfortably in our office. Ortho Exam  MS: Evaluation of the left knee reveals no outward deformity. Tenderness over the quads insertion along the central aspect of the quads is appreciated. No instability of the left knee is noted. The patient has full range of motion left knee. Motor, sensory, vascular examination left lower extremity is grossly intact without focal deficits. Neuro: alert and oriented to person and place. Eyes: Extra-ocular muscles intact  Mouth: Oral mucosa moist. No perioral lesions  Pulm: Respirations unlabored and regular. Symmetric chest excursion without outward deformity is noted. Skin: warm, well perfused  Psych:   Patient has good fund of knowledge and displays understanging of exam, diagnosis, and plan. Radiology:   XR KNEE RIGHT (MIN 4 VIEWS)    Result Date: 12/16/2022  History:   Right knee pain Findings:   Standing AP/Lateral/Tunnel/Merchant view xrays of the Right done in the office today shows mild  medial joint space narrowing, tricompartmental osteophytosis, joint line sclerosis medially. No evidence of fracture, subluxation, dislocation, radioopaque foreign body/tumor is noted. Lateral subluxation of the tibia is appreciated. Positive patellar tooth sign is appreciated. Impression:   Right knee mild  degenerative changes as described above. Assessment:      1. Quadriceps tendonitis         Plan:      The patient would like to try a Medrol Dosepak and home exercise program.  Consideration of repeat corticosteroid injection could be made in the future however there is some risk of attritional rupture if you were to continue with injections. Otherwise I plan to see the patient back as needed. Another consideration was discussed today would be surgical debridement of the tendinopathic tissue and repair of the quads. He notes understanding.      Follow up:No follow-ups on file. Orders Placed This Encounter   Medications    methylPREDNISolone (MEDROL DOSEPACK) 4 MG tablet     Sig: Take by mouth. Dispense:  1 kit     Refill:  1         No orders of the defined types were placed in this encounter. This note is created with the assistance of a speech recognition program.  While intending to generate a document that actually reflects the content of the visit, the document can still have some errors including those of syntax and sound a like substitutions which may escape proof reading.   In such instances, actual meaning can be extrapolated by contextual diversion      Electronically signed by Julianna Rothman, DO, Brandon Collet on 12/18/2022 at 3:09 PM

## 2022-12-19 RX ORDER — TADALAFIL 20 MG/1
TABLET ORAL
Qty: 20 TABLET | Refills: 0 | Status: SHIPPED | OUTPATIENT
Start: 2022-12-19 | End: 2023-01-09

## 2023-01-06 DIAGNOSIS — Z76.0 MEDICATION REFILL: ICD-10-CM

## 2023-01-09 RX ORDER — TADALAFIL 20 MG/1
TABLET ORAL
Qty: 20 TABLET | Refills: 0 | Status: SHIPPED | OUTPATIENT
Start: 2023-01-09

## 2023-01-26 DIAGNOSIS — Z76.0 MEDICATION REFILL: ICD-10-CM

## 2023-01-26 RX ORDER — TADALAFIL 20 MG/1
TABLET ORAL
Qty: 20 TABLET | Refills: 0 | Status: SHIPPED | OUTPATIENT
Start: 2023-01-26

## 2023-01-26 NOTE — TELEPHONE ENCOUNTER
Eitan Churchill is calling to request a refill on the following medication(s):    Medication Request:  Requested Prescriptions     Pending Prescriptions Disp Refills    tadalafil (CIALIS) 20 MG tablet 20 tablet 0       Last Visit Date (If Applicable):  46/9/4010    Next Visit Date:    Visit date not found

## 2023-02-09 ENCOUNTER — HOSPITAL ENCOUNTER (EMERGENCY)
Age: 48
Discharge: HOME | End: 2023-02-09
Attending: EMERGENCY MEDICINE
Payer: MEDICAID

## 2023-02-09 ENCOUNTER — APPOINTMENT (OUTPATIENT)
Dept: GENERAL RADIOLOGY | Age: 48
End: 2023-02-09
Payer: MEDICAID

## 2023-02-09 VITALS
SYSTOLIC BLOOD PRESSURE: 173 MMHG | HEART RATE: 75 BPM | HEIGHT: 72 IN | DIASTOLIC BLOOD PRESSURE: 119 MMHG | OXYGEN SATURATION: 97 % | TEMPERATURE: 98.6 F | RESPIRATION RATE: 18 BRPM | BODY MASS INDEX: 28.31 KG/M2 | WEIGHT: 209 LBS

## 2023-02-09 DIAGNOSIS — G89.29 CHRONIC BILATERAL LOW BACK PAIN WITHOUT SCIATICA: Primary | ICD-10-CM

## 2023-02-09 DIAGNOSIS — M54.12 CERVICAL RADICULOPATHY: ICD-10-CM

## 2023-02-09 DIAGNOSIS — M54.50 CHRONIC BILATERAL LOW BACK PAIN WITHOUT SCIATICA: Primary | ICD-10-CM

## 2023-02-09 PROCEDURE — 72040 X-RAY EXAM NECK SPINE 2-3 VW: CPT

## 2023-02-09 PROCEDURE — 6360000002 HC RX W HCPCS: Performed by: NURSE PRACTITIONER

## 2023-02-09 PROCEDURE — 99284 EMERGENCY DEPT VISIT MOD MDM: CPT

## 2023-02-09 PROCEDURE — 72100 X-RAY EXAM L-S SPINE 2/3 VWS: CPT

## 2023-02-09 PROCEDURE — 96372 THER/PROPH/DIAG INJ SC/IM: CPT

## 2023-02-09 PROCEDURE — 6370000000 HC RX 637 (ALT 250 FOR IP): Performed by: NURSE PRACTITIONER

## 2023-02-09 RX ORDER — LIDOCAINE 50 MG/G
1 PATCH TOPICAL DAILY
Qty: 10 PATCH | Refills: 0 | Status: SHIPPED | OUTPATIENT
Start: 2023-02-09 | End: 2023-02-19

## 2023-02-09 RX ORDER — KETOROLAC TROMETHAMINE 30 MG/ML
30 INJECTION, SOLUTION INTRAMUSCULAR; INTRAVENOUS ONCE
Status: COMPLETED | OUTPATIENT
Start: 2023-02-09 | End: 2023-02-09

## 2023-02-09 RX ORDER — LIDOCAINE 4 G/G
1 PATCH TOPICAL DAILY
Status: DISCONTINUED | OUTPATIENT
Start: 2023-02-09 | End: 2023-02-09 | Stop reason: HOSPADM

## 2023-02-09 RX ADMIN — KETOROLAC TROMETHAMINE 30 MG: 30 INJECTION, SOLUTION INTRAMUSCULAR; INTRAVENOUS at 17:33

## 2023-02-09 ASSESSMENT — PAIN DESCRIPTION - PAIN TYPE: TYPE: CHRONIC PAIN

## 2023-02-09 ASSESSMENT — ENCOUNTER SYMPTOMS
GASTROINTESTINAL NEGATIVE: 1
BACK PAIN: 1
EYES NEGATIVE: 1
RESPIRATORY NEGATIVE: 1

## 2023-02-09 ASSESSMENT — PAIN SCALES - GENERAL
PAINLEVEL_OUTOF10: 8
PAINLEVEL_OUTOF10: 10

## 2023-02-09 ASSESSMENT — PAIN DESCRIPTION - LOCATION: LOCATION: BACK

## 2023-02-09 ASSESSMENT — PAIN - FUNCTIONAL ASSESSMENT: PAIN_FUNCTIONAL_ASSESSMENT: 0-10

## 2023-02-09 NOTE — ED PROVIDER NOTES
[facility]  [address] [facility] [address] [facility] [address]  Phone: [phone]  Fax: [phone]        Pt Name: Kendra Gonzalez  MRN: [id number]  [place] 1975  Date of evaluation: 2/9/23    [address] [place] [address]       Chief Complaint   Patient presents with    Back Pain       HISTORY OF PRESENT ILLNESS (Location/Symptom, Timing/Onset, Context/Setting, Quality, Duration, Modifying Factors, Severity)      Kendra Gonzalez is a 41 y.o. male with no pertinent PMH who presents to the ED via private auto with complaints of neck and back pain. Patient has a history of chronic neck and back pain. He states that he has recently been trying to do yoga as he has this knee pain, and its been helping with the knee pain. He states that he is unsure if maybe that has maybe exacerbated his chronic neck pain. He states that he has been dealing with this for quite some time. He had MRI of both the cervical spine as well as lumbar spine about a year ago. He does not follow with any Ortho currently. He states he has been taking tramadol for symptoms, which he takes at night, which does take the edge off. He states he only takes at night as it makes him drowsy. He states that he has tried muscle relaxers in the past, but he states that they do not sit well with him. He states that he has tried steroids in the past as well, which he states he did not do well either. He has not been taking anything other than the tramadol at night. The neck pains been present for the last couple of days. He states that he has some pain that radiates down his left arm occasionally. The pain is occasionally reproducible, especially in the trapezius region. He has no trouble urinating or defecating. No episodes of incontinence of urine or stool. He states rest ice and heat make the symptoms better. He states movement and use make the symptoms worse.     PAST MEDICAL / SURGICAL / SOCIAL / FAMILY HISTORY     PMH:  has a past medical history of Arthritis, Caffeine use, Chronic back pain, and Erectile dysfunction. Surgical History:  has a past surgical history that includes Vasectomy (Bilateral, 12/21/2016) and Quadriceps repair (Right, 2012). Social History:  reports that he has never smoked. He has never used smokeless tobacco. He reports current alcohol use. He reports that he does not use drugs. Family History: has no family status information on file. family history is not on file. Psychiatric History: None    Allergies: Patient has no known allergies. Home Medications:   Prior to Admission medications    Medication Sig Start Date End Date Taking? Authorizing Provider   lidocaine (LIDODERM) 5 % Place 1 patch onto the skin daily for 10 days 12 hours on, 12 hours off. 2/9/23 2/19/23 Yes ABBY Collins NP   tadalafil (CIALIS) 20 MG tablet TAKE ONE TABLET BY MOUTH EVERY DAY AS NEEDED BEFORE INTERCOURSE 1/26/23   ABBY Villaseñor CNP   diclofenac sodium (VOLTAREN) 1 % GEL APPLY 4 GRAMS TOPICALLY FOUR TIMES DAILY AS NEEDED FOR PAIN 11/23/22   ABBY Villaseñor CNP   polyethylene glycol [facility]) 17 GM/SCOOP powder Follow Instructions provided by physician's office  Patient not taking: Reported on 10/7/2022 7/6/22   Sarah Saavedra MD   bisacodyl (BISACODYL) 5 MG EC tablet Take 4 tablets in the morning  Patient not taking: Reported on 10/7/2022 7/6/22   Sarah Saavedra MD   Cholecalciferol (VITAMIN D) 50 MCG (2000 UT) CAPS capsule Take by mouth    Historical Provider, MD   baclofen (LIORESAL) 10 MG tablet Take 10 mg by mouth 2 times daily    Historical Provider, MD   gabapentin (NEURONTIN) 100 MG capsule Take 1 capsule by mouth 3 times daily for 180 days.  Intended supply: 30 days 3/31/21 10/7/22  Nikolas Caal MD   DICLOFENAC PO Take 25 mg by mouth 2 times daily as needed    Historical Provider, MD   traMADol (ULTRAM) 50 MG tablet Take 50 mg by mouth every 6 hours as needed for Pain    Historical Provider, MD       REVIEW OF SYSTEMS  (2-9 systems for level 4, 10 ormore for level 5)      Review of Systems   Constitutional: Negative. HENT: Negative. Eyes: Negative. Respiratory: Negative. Cardiovascular: Negative. Gastrointestinal: Negative. Endocrine: Negative. Genitourinary: Negative. Musculoskeletal:  Positive for back pain and neck pain. Negative for arthralgias, gait problem, joint swelling, myalgias and neck stiffness. Skin: Negative. Neurological: Negative. Hematological: Negative. Psychiatric/Behavioral: Negative. All other systems negative except as marked. PHYSICAL EXAM  (up to 7 for level 4, 8 or more for level 5)      INITIAL VITALS:  height is 6' (1.829 m) and weight is 94.8 kg (209 lb). His oral temperature is 98.6 °F (37 °C). His blood pressure is 173/119 (abnormal) and his pulse is 75. His respiration is 18 and oxygen saturation is 97%. Vital signs reviewed. Physical Exam  Constitutional:       Appearance: Normal appearance. HENT:      Head: Normocephalic. Right Ear: External ear normal.      Left Ear: External ear normal.      Nose: Nose normal.      Mouth/Throat:      Mouth: Mucous membranes are moist.      Pharynx: Oropharynx is clear. Eyes:      Extraocular Movements: Extraocular movements intact. Conjunctiva/sclera: Conjunctivae normal.      Pupils: Pupils are equal, round, and reactive to light. Cardiovascular:      Rate and Rhythm: Normal rate and regular rhythm. Pulses: Normal pulses. Heart sounds: Normal heart sounds. Pulmonary:      Effort: Pulmonary effort is normal.      Breath sounds: Normal breath sounds. Abdominal:      General: Bowel sounds are normal. There is no distension. Palpations: Abdomen is soft. Tenderness: There is no abdominal tenderness. There is no right CVA tenderness, left CVA tenderness or guarding.    Musculoskeletal:         General: Normal range of motion. Cervical back: Normal range of motion. No tenderness. Comments: No focal spinal tenderness or step-off noted along the entire spine   Skin:     General: Skin is warm and dry. Capillary Refill: Capillary refill takes less than 2 seconds. Neurological:      Mental Status: He is alert and oriented to person, place, and time. Sensory: No sensory deficit. Motor: No weakness. Coordination: Coordination normal.      Gait: Gait normal.   Psychiatric:         Mood and Affect: Mood normal.         Behavior: Behavior normal.         Thought Content: Thought content normal.         Judgment: Judgment normal.         DIFFERENTIAL DIAGNOSIS / MDM     Upon exam, patient is resting in his room. He appears nontoxic no distress is noted. Heart sounds within normal limits auscultation. Lung sounds are clear bilaterally. Bowel sounds are present in all 4 quadrants. No abdominal surgical tenderness or guarding is noted. No CVA tenderness bilaterally. No focal spinal tenderness or step-off noted along the entire spine. Order some x-rays of the cervical and lumbar spine. I will give patient Toradol and order lidocaine patch as well. He does not want any muscle relaxers, as I did offer him Norflex, as he states he has a bad reaction of these. He states he does not want anything stronger than Toradol either. X-ray showing no acute lumbar spine abnormality, normal lateral alignment, moderate multilevel degenerative disc disease and mild hypertrophic changes per radiologist read. No acute cervical spine abnormality, minimal degenerative changes, normal alignment per radiologist read. I discussed results with the patient and his wife. I did offer to send him home with a prescription for steroid and muscle relaxer, which he declined. He is educated that he is welcome to take ibuprofen and Tylenol throughout the day for pain control.   He is welcome to continue taking the tramadol at night. I will send him home with a prescription for Lidoderm patches as well. He should follow-up with his primary care physician as well as we do back specialist.  I provided that information with his discharge instructions. Return to emergency department with any numbness or tingling lower extremities, trouble urinating or defecating, episodes of incontinence, or any other new concerning or worsening symptoms. Patient states understanding of education and is stable for outpatient follow-up at this time. PLAN (LABS / IMAGING / EKG):  Orders Placed This Encounter   Procedures    XR CERVICAL SPINE (2-3 VIEWS)    XR LUMBAR SPINE (2-3 VIEWS)       MEDICATIONS ORDERED:  Orders Placed This Encounter   Medications    ketorolac (TORADOL) injection 30 mg    lidocaine 4 % external patch 1 patch    lidocaine (LIDODERM) 5 %     Sig: Place 1 patch onto the skin daily for 10 days 12 hours on, 12 hours off. Dispense:  10 patch     Refill:  0       Controlled Substances Monitoring:     DIAGNOSTIC RESULTS     EKG: All EKG's are interpreted by the Emergency Department Physician who either signs or Co-signs this chart in the absenceof a cardiologist.      RADIOLOGY: All images are read by the radiologist and their interpretations are reviewed. XR CERVICAL SPINE (2-3 VIEWS)   Preliminary Result   1. No acute lumbar spine abnormality. Normal lateral alignment. Moderate   multilevel degenerative disc disease and mild hypertrophic changes. 2. No acute cervical spine abnormality. Minimal degenerative changes. Normal alignment. XR LUMBAR SPINE (2-3 VIEWS)   Preliminary Result   1. No acute lumbar spine abnormality. Normal lateral alignment. Moderate   multilevel degenerative disc disease and mild hypertrophic changes. 2. No acute cervical spine abnormality. Minimal degenerative changes. Normal alignment. No results found.     LABS:  No results found for this visit on 02/09/23. EMERGENCY DEPARTMENT COURSE           Vitals:    Vitals:    02/09/23 1720 02/09/23 1814   BP: (!) 171/107 (!) 173/119   Pulse: 75    Resp: 18    Temp: 98.6 °F (37 °C)    TempSrc: Oral    SpO2: 97%    Weight: 94.8 kg (209 lb)    Height: 6' (1.829 m)      -------------------------  BP: (!) 173/119, Temp: 98.6 °F (37 °C), Heart Rate: 75, Resp: 18      RE-EVALUATION:  See ED Course notes above. CONSULTS:  None    PROCEDURES:  None    FINAL IMPRESSION      1. Chronic bilateral low back pain without sciatica    2. Cervical radiculopathy          DISPOSITION / PLAN     CONDITION ON DISPOSITION:   Good / Stable for discharge. PATIENT REFERRED TO:  ABBY Lan CNP  [address]  [phone]    Schedule an appointment as soon as possible for a visit       [facility] Department  [address] [facility].   [address] [facility] [address] [phone]  Go to   If symptoms worsen    [place] [id number] [phone]    Schedule an appointment as soon as possible for a visit       DISCHARGE MEDICATIONS:  Discharge Medication List as of 2/9/2023  6:22 PM        START taking these medications    Details   lidocaine (LIDODERM) 5 % Place 1 patch onto the skin daily for 10 days 12 hours on, 12 hours off., Disp-10 patch, R-0Normal             ABBY Richard NP   Emergency Medicine Nurse Practitioner    (Please note that portions of this note were completed with a voice recognition program.  Efforts were made to edit the dictations but occasionally words aremis-transcribed.)      ABBY Richard NP  02/09/23 [id number]

## 2023-02-09 NOTE — ED PROVIDER NOTES
[facility] Emergency Department    [address] [address] [facility] [address] [facility] [address]  Phone: [phone]  Fax: [phone]  Emergency Department  Faculty Attestation    I performed a history and physical examination of the patient and discussed management with the mid level provideer. I reviewed the mid level provider's note and agree with the documented findings and plan of care. Any areas of disagreement are noted on the chart. I was personally present for the key portions of any procedures. I have documented in the chart those procedures where I was not present during the key portions. I have reviewed the emergency nurses triage note. I agree with the chief complaint, past medical history, past surgical history, allergies, medications, social and family history as documented unless otherwise noted below. Documentation of the HPI, Physical Exam and Medical Decision Making performed by medical students or scribes is based on my personal performance of the HPI, PE and MDM. For Physician Assistant/ Nurse Practitioner cases/documentation I have personally evaluated this patient and have completed at least one if not all key elements of the E/M (history, physical exam, and MDM). Additional findings are as noted. Primary Care Physician:  Adela Lucero, ABBY - [place] [address]       Chief Complaint   Patient presents with    Back Pain       RECENT VITALS:   Temp: 98.6 °F (37 °C),  Heart Rate: 75, Resp: 18, BP: (!) 171/107    LABS:  Labs Reviewed - No data to display     XR CERVICAL SPINE (2-3 VIEWS) (Preliminary result)  Result time 02/09/23 18:09:27  Preliminary result by Mahesh Marmolejo MD (02/09/23 18:09:27)                Impression:    1. No acute lumbar spine abnormality. Normal lateral alignment. Moderate   multilevel degenerative disc disease and mild hypertrophic changes. 2. No acute cervical spine abnormality. Minimal degenerative changes. Normal alignment. Narrative:    EXAMINATION:   THREE XRAY VIEWS OF THE LUMBAR SPINE; FIVE XRAY VIEWS OF THE CERVICAL SPINE     2/9/2023 5:43 pm     COMPARISON:   Lumbar spine MRI from September 8, 2021     HISTORY:   ORDERING SYSTEM PROVIDED HISTORY: pain   TECHNOLOGIST PROVIDED HISTORY:   pain   Reason for Exam: lower back pain, no known injury; ORDERING SYSTEM PROVIDED   HISTORY: pain   TECHNOLOGIST PROVIDED HISTORY:   pain   Reason for Exam: posterior neck pain radiates down left shoulder, no injury     FINDINGS:   Lumbar spine x-ray:     Normal lateral alignment. No acute compression injury. No evidence of   spondylolisthesis or spondylolysis. Moderate multilevel degenerative disc   disease and mild hypertrophic changes. No obvious paraspinal abnormality. Cervical spine x-ray:     Cervical spine demonstrates normal lateral alignment. C1-C2 and odontoid   appear stable. Minimal multilevel degenerative disc disease. Paraspinal   soft tissues appear unremarkable. Preliminary result by Alexa Booker (02/09/23 18:06:41)                Impression:    1. No acute lumbar spine abnormality. Normal lateral alignment. Moderate   multilevel degenerative disc disease and mild hypertrophic changes. 2. No acute cervical spine abnormality. Minimal degenerative changes. Normal alignment. XR LUMBAR SPINE (2-3 VIEWS) (Preliminary result)  Result time 02/09/23 18:09:27  Preliminary result by Marcela Ann MD (02/09/23 18:09:27)                Impression:    1. No acute lumbar spine abnormality. Normal lateral alignment. Moderate   multilevel degenerative disc disease and mild hypertrophic changes. 2. No acute cervical spine abnormality. Minimal degenerative changes. Normal alignment.              Narrative:    EXAMINATION:   THREE XRAY VIEWS OF THE LUMBAR SPINE; FIVE XRAY VIEWS OF THE CERVICAL SPINE     2/9/2023 5:43 pm     COMPARISON:   Lumbar spine MRI from September 8, 2021 HISTORY:   ORDERING SYSTEM PROVIDED HISTORY: pain   TECHNOLOGIST PROVIDED HISTORY:   pain   Reason for Exam: lower back pain, no known injury; ORDERING SYSTEM PROVIDED   HISTORY: pain   TECHNOLOGIST PROVIDED HISTORY:   pain   Reason for Exam: posterior neck pain radiates down left shoulder, no injury     FINDINGS:   Lumbar spine x-ray:     Normal lateral alignment. No acute compression injury. No evidence of   spondylolisthesis or spondylolysis. Moderate multilevel degenerative disc   disease and mild hypertrophic changes. No obvious paraspinal abnormality. Cervical spine x-ray:     Cervical spine demonstrates normal lateral alignment. C1-C2 and odontoid   appear stable. Minimal multilevel degenerative disc disease. Paraspinal   soft tissues appear unremarkable. Preliminary result by Celia Velez (02/09/23 18:06:41)                Impression:    1. No acute lumbar spine abnormality. Normal lateral alignment. Moderate   multilevel degenerative disc disease and mild hypertrophic changes. 2. No acute cervical spine abnormality. Minimal degenerative changes. Normal alignment. PERTINENT ATTENDING PHYSICIAN COMMENTS:    Patient presents with neck discomfort and back discomfort. He started doing yoga recently and has noted some discomfort. He says he occasionally has radicular pain into his arms but no true weakness or numbness. He denies bowel or bladder issues. Exam, patient has no midline tenderness to palpation. He has plus 5 out of 5 gross motor strength in all extremities and no saddle paresthesias. [id number]

## 2023-02-17 ENCOUNTER — TELEPHONE (OUTPATIENT)
Dept: GASTROENTEROLOGY | Age: 48
End: 2023-02-17

## 2023-02-17 NOTE — TELEPHONE ENCOUNTER
Writer received VM from patient stating he would like to cancel and R/S colon procedure with JULIOCESAR Genao. Marti Forbes to notify patient that we have taken him off of the calender and schedule for JULIOCESAR Genao.

## 2023-03-01 ENCOUNTER — OFFICE VISIT (OUTPATIENT)
Dept: ORTHOPEDIC SURGERY | Age: 48
End: 2023-03-01
Payer: MEDICAID

## 2023-03-01 VITALS — WEIGHT: 209 LBS | BODY MASS INDEX: 28.31 KG/M2 | HEIGHT: 72 IN | RESPIRATION RATE: 16 BRPM

## 2023-03-01 DIAGNOSIS — M51.36 DDD (DEGENERATIVE DISC DISEASE), LUMBAR: ICD-10-CM

## 2023-03-01 DIAGNOSIS — M54.2 ACUTE NECK PAIN: ICD-10-CM

## 2023-03-01 DIAGNOSIS — M48.02 CERVICAL STENOSIS OF SPINAL CANAL: Primary | ICD-10-CM

## 2023-03-01 DIAGNOSIS — M54.16 LUMBAR RADICULOPATHY: ICD-10-CM

## 2023-03-01 PROCEDURE — 99213 OFFICE O/P EST LOW 20 MIN: CPT | Performed by: PHYSICIAN ASSISTANT

## 2023-03-06 NOTE — PROGRESS NOTES
[address] [facility], [address] [place] [address] Road [address] [place] [address], [address] [place] [address], [id number] [place] [address]           Dept Phone: [phone]           Dept Fax:  [phone] [facility]           Rica Clark          Dept Phone: [phone]           Dept Fax:  [phone]      Chief Compliant:  Chief Complaint   Patient presents with    Back Pain     lower        History of Present Illness: This is a 41 y.o. male who presents to the clinic today for evaluation of chronic neck and low back pain. Patient reports these issues have been present for a number of years but seems to have made significant worse over the past several months. Chronic neck pain:  Localizes pain greatest to the bilateral neck area with radiation of pain into left greater than right upper extremities. Associate with numbness and tingling in the left arm intermittently as well as intermittent weakness of the left arm as well. He reports pain seems to be greatest in the trapezius region especially on the left side but does note some pain on the right intermittently. He denies any recent injury or trauma    Chronic low back pain    Patient reports pain in the low back has also been present for a number of years. Localizes greatest to the left side with occasional radiation into the left lower extremity. Notes intermittent paresthesias of the left leg. No episodes of incontinence bowel or bladder dysfunction, fever or chills no recent injury or trauma. He has tried muscle relaxants and over-the-counter NSAIDs with minimal improvement of pain. Patient was evaluated in the ED on 2/9/2023 where he had x-rays of both the cervical and lumbar spine demonstrated moderate multilevel degenerative disc disease of the lumbar spine and minimal degenerative changes of the cervical spine.     Also with history of MRI of the lumbar and cervical spine in 2021 which demonstrated multilevel degenerative disc disease of the lumbar spine with 4 medial narrowing but minimal central canal stenosis. MRI of the cervical spine Done in 2021 is well demonstrated multilevel DDD with mild stenosis at C3-4 and C4-5. Patient reports he treated this conservatively no surgery or injections were done at that time. Past History:    Current Outpatient Medications:     tadalafil (CIALIS) 20 MG tablet, TAKE ONE TABLET BY MOUTH EVERY DAY AS NEEDED BEFORE intercourse, Disp: 20 tablet, Rfl: 2    diclofenac sodium (VOLTAREN) 1 % GEL, APPLY 4 GRAMS TOPICALLY FOUR TIMES DAILY AS NEEDED FOR PAIN, Disp: 100 g, Rfl: 0    polyethylene glycol (GLYCOLAX) 17 GM/SCOOP powder, Follow Instructions provided by physician's office (Patient not taking: Reported on 10/7/2022), Disp: 238 g, Rfl: 0    bisacodyl (BISACODYL) 5 MG EC tablet, Take 4 tablets in the morning (Patient not taking: Reported on 10/7/2022), Disp: 4 tablet, Rfl: 0    Cholecalciferol (VITAMIN D) 50 MCG (2000 UT) CAPS capsule, Take by mouth, Disp: , Rfl:     baclofen (LIORESAL) 10 MG tablet, Take 10 mg by mouth 2 times daily, Disp: , Rfl:     gabapentin (NEURONTIN) 100 MG capsule, Take 1 capsule by mouth 3 times daily for 180 days. Intended supply: 30 days, Disp: 90 capsule, Rfl: 5    DICLOFENAC PO, Take 25 mg by mouth 2 times daily as needed, Disp: , Rfl:     traMADol (ULTRAM) 50 MG tablet, Take 50 mg by mouth every 6 hours as needed for Pain, Disp: , Rfl:   No Known Allergies  Social History     Socioeconomic History    Marital status: Single     Spouse name: Not on file    Number of children: Not on file    Years of education: Not on file    Highest education level: Not on file   Occupational History    Not on file   Tobacco Use    Smoking status: Never    Smokeless tobacco: Never   Vaping Use    Vaping Use: Never used   Substance and Sexual Activity    Alcohol use: Yes     Comment: 5 week    Drug use:  No Sexual activity: Yes     Partners: Female   Other Topics Concern    Not on file   Social History Narrative    Not on file     Social Determinants of Health     Financial Resource Strain: Low Risk     Difficulty of Paying Living Expenses: Not hard at all   Food Insecurity: No Food Insecurity    Worried About [address] in the Last Year: Never true    [address] in the Last Year: Never true   Transportation Needs: No Transportation Needs    Lack of Transportation (Medical): No    Lack of Transportation (Non-Medical): No   Physical Activity: Not on file   Stress: Not on file   Social Connections: Not on file   Intimate Partner Violence: Not on file   Housing Stability: Not on file     Past Medical History:   Diagnosis Date    Arthritis     Caffeine use     1 tea, 2 soda/day    Chronic back pain     Erectile dysfunction      Past Surgical History:   Procedure Laterality Date    QUADRICEPS REPAIR Right 2012    VASECTOMY Bilateral 12/21/2016     No family history on file. Review of Systems   Constitutional: Negative for fever, chills, sweats. Eyes: Negative for changes in vision, or pain. HENT: Negative for ear ache, epistaxis, or sore throat. Respiratory/Cardio: Negative for Chest pain, palpitations, SOB, or cough. Gastrointestinal: Negative for abdominal pain, N/V/D. Genitourinary: Negative for dysuria, frequency, urgency, or hematuria. Neurological: Negative for headache, numbness, or weakness. Integumentary: Negative for rash, itching, laceration, or abrasion. Musculoskeletal: Positive for Back Pain (lower)       Physical Exam:  Constitutional: Patient is oriented to person, place, and time. Patient appears well-developed and well nourished. HENT: Negative otherwise noted  Head: Normocephalic and Atraumatic  Nose: Normal  Eyes: Conjunctivae and EOM are normal  Neck: Normal range of motion Neck supple. Respiratory/Cardio: Effort normal. No respiratory distress.   Musculoskeletal: CERVICAL EXAMINATION:  Inspection: Local inspection shows no step-off or bruising. Cervical alignment is normal.     Palpation: No evidence of tenderness at the midline, and trapezius. Paraspinal tenderness is present. There is no step-off or paraspinal spasm. Range of Motion: Cervical flexion, extension, and rotation are mildly reduced with pain. Strength: 5/5 bilateral upper extremities   Special Tests:      Spurling's, L'Hermitte's & Chan's negative bilaterally. Barrientos and Impingement tests are negative bilaterally. Cubital and Carpal tunnel Tinel's negative bilaterally. Skin:There are no rashes, ulcerations or lesions in right & left upper extremities. Reflexes: Bilaterally triceps, biceps and brachioradialis are 2+. Clonus absent bilaterally at the feet. Gait & station: normal, patient ambulates without assistance     Additional Examinations:       RIGHT UPPER EXTREMITY:  Inspection/examination of the right upper extremity does not show any tenderness, deformity or injury. Range of motion is unremarkable. There is no gross instability. There are no rashes, ulcerations or lesions. Strength and tone are normal.  LEFT UPPER EXTREMITY: Inspection/examination of the left upper extremity does not show any tenderness, deformity or injury. Range of motion is unremarkable. There is no gross instability. There are no rashes, ulcerations or lesions. Strength and tone are normal.      LUMBAR/SACRAL EXAMINATION:  Inspection: Local inspection shows no step-off or bruising. Lumbar alignment is normal.  Sagittal and Coronal balance is neutral.      Palpation:   diffuse tenderness bilaterally at the paraspinal. No evidence of tenderness at the midline. There is no step-off or paraspinal spasm. Range of Motion: Lumbar flexion, extension and rotation are mildly limited due to pain. Strength:   Strength testing is 5/5 in all muscle groups tested.    Special Tests:   Straight leg raise and crossed SLR negative. Leg length and pelvis level.  0 out of 5 Kota's signs. Skin: There are no rashes, ulcerations or lesions. Reflexes: Reflexes are symmetrically 2+ at the patellar and ankle tendons. Clonus absent bilaterally at the feet. Gait & station: normal, patient ambulates without assistance  Additional Examinations:   RIGHT LOWER EXTREMITY: Inspection/examination of the right lower extremity does not show any tenderness, deformity or injury. Range of motion is unremarkable. There is no gross instability. There are no rashes, ulcerations or lesions. Strength and tone are normal.  LEFT LOWER EXTREMITY:  Inspection/examination of the left lower extremity does not show any tenderness, deformity or injury. Range of motion is unremarkable. There is no gross instability. There are no rashes, ulcerations or lesions. Strength and tone are normal.    Neurological: Patient is alert and oriented to person, place, and time. Normal strenght. No sensory deficit. Skin: Skin is warm and dry  Psychiatric: Behavior is normal. Thought content normal.  Nursing note and vitals reviewed. Labs and Imaging:     XR taken today:  No results found. Narrative   EXAMINATION:   THREE XRAY VIEWS OF THE LUMBAR SPINE; FIVE XRAY VIEWS OF THE CERVICAL SPINE       2/9/2023 5:43 pm       COMPARISON:   Lumbar spine MRI from September 8, 2021       HISTORY:   ORDERING SYSTEM PROVIDED HISTORY: pain   TECHNOLOGIST PROVIDED HISTORY:   pain   Reason for Exam: lower back pain, no known injury; ORDERING SYSTEM PROVIDED   HISTORY: pain   TECHNOLOGIST PROVIDED HISTORY:   pain   Reason for Exam: posterior neck pain radiates down left shoulder, no injury       FINDINGS:   Lumbar spine x-ray:       Normal lateral alignment. No acute compression injury. No evidence of   spondylolisthesis or spondylolysis. Moderate multilevel degenerative disc   disease and mild hypertrophic changes. No obvious paraspinal abnormality.        Cervical spine x-ray:       Cervical spine demonstrates normal lateral alignment. C1-C2 and odontoid   appear stable. Minimal multilevel degenerative disc disease. Paraspinal   soft tissues appear unremarkable. Impression   1. No acute lumbar spine abnormality. Normal lateral alignment. Moderate   multilevel degenerative disc disease and mild hypertrophic changes. 2. No acute cervical spine abnormality. Minimal degenerative changes. Normal alignment. Narrative   EXAMINATION:   THREE XRAY VIEWS OF THE LUMBAR SPINE; FIVE XRAY VIEWS OF THE CERVICAL SPINE       2/9/2023 5:43 pm       COMPARISON:   Lumbar spine MRI from September 8, 2021       HISTORY:   ORDERING SYSTEM PROVIDED HISTORY: pain   TECHNOLOGIST PROVIDED HISTORY:   pain   Reason for Exam: lower back pain, no known injury; ORDERING SYSTEM PROVIDED   HISTORY: pain   TECHNOLOGIST PROVIDED HISTORY:   pain   Reason for Exam: posterior neck pain radiates down left shoulder, no injury       FINDINGS:   Lumbar spine x-ray:       Normal lateral alignment. No acute compression injury. No evidence of   spondylolisthesis or spondylolysis. Moderate multilevel degenerative disc   disease and mild hypertrophic changes. No obvious paraspinal abnormality. Cervical spine x-ray:       Cervical spine demonstrates normal lateral alignment. C1-C2 and odontoid   appear stable. Minimal multilevel degenerative disc disease. Paraspinal   soft tissues appear unremarkable. Impression   1. No acute lumbar spine abnormality. Normal lateral alignment. Moderate   multilevel degenerative disc disease and mild hypertrophic changes. 2. No acute cervical spine abnormality. Minimal degenerative changes. Normal alignment. X-rays from 2/9/2023 available for independent review demonstrating minimal degenerative changes of the cervical spine greatest at C4-5 and C5-6.   Lumbar spine with moderate multilevel degenerative changes greatest at L4-5 and L5-S1 no evidence of acute fracture seen in either the cervical or lumbar spine       Orders Placed This Encounter   Procedures    MRI CERVICAL SPINE WO CONTRAST     Standing Status:   Future     Standing Expiration Date:   3/1/2024    MRI LUMBAR SPINE WO CONTRAST     Standing Status:   Future     Standing Expiration Date:   3/1/2024       Assessment and Plan:  1. Cervical stenosis of spinal canal    2. Acute neck pain    3. DDD (degenerative disc disease), lumbar    4. Lumbar radiculopathy          PLAN:  Bre Major is a 41 y.o. old malewho presents today for evaluation of chronic neck and low back pain. Patient has had this pain for a number of years in fact had MRIs of both the cervical and lumbar spine back in 2021. Over the last several months he reports symptoms have worsened especially in the neck and the left upper extremity. Notes intermittent paresthesias in the left arm greater than the left leg. 1.  Patient has tried physical therapy, chiropractic care, over-the-counter NSAIDs and prescription muscle relaxants with minimal improvement. 2.  Concern for possible cervical DDD with disc herniation similar concerns of the lumbar spine as well. Given his failure of conservative management recommend further diagnostic evaluation with MRI of the cervical and lumbar spine. Please note that this chart was generated using voice recognition Dragon dictation software. Although every effort was made to ensure the accuracy of this automated transcription, some errors in transcription may have occurred.

## 2023-03-17 ENCOUNTER — HOSPITAL ENCOUNTER (OUTPATIENT)
Dept: MRI IMAGING | Age: 48
End: 2023-03-17
Payer: MEDICAID

## 2023-03-17 DIAGNOSIS — M51.36 DDD (DEGENERATIVE DISC DISEASE), LUMBAR: ICD-10-CM

## 2023-03-17 DIAGNOSIS — M48.02 CERVICAL STENOSIS OF SPINAL CANAL: ICD-10-CM

## 2023-03-17 DIAGNOSIS — M54.16 LUMBAR RADICULOPATHY: ICD-10-CM

## 2023-03-17 DIAGNOSIS — M54.2 ACUTE NECK PAIN: ICD-10-CM

## 2023-03-17 PROCEDURE — 72141 MRI NECK SPINE W/O DYE: CPT

## 2023-03-17 PROCEDURE — 72148 MRI LUMBAR SPINE W/O DYE: CPT

## 2023-03-20 ENCOUNTER — TELEPHONE (OUTPATIENT)
Dept: ORTHOPEDIC SURGERY | Age: 48
End: 2023-03-20

## 2023-03-20 NOTE — TELEPHONE ENCOUNTER
----- [facility] sent at 3/20/2023  8:21 AM EDT -----  MRI lumbar spine: Multilevel lumbar DDD without significant stenosis or nerve impingement    MRI cervical spine: Multilevel cervical DDD without evidence of significant canal stenosis or nerve impingement.     Patient has failed conservative management including physical therapy and pain management injections recommend follow-up with Dr. Lisa Worley to discuss MRI results and treatment options

## 2023-03-21 NOTE — TELEPHONE ENCOUNTER
Called pt and gave him the results of his MRI results. He has declined an appt with Dr. Celia Burnett at this time stating that he needs to talk with his PCP about this first. He was advised to call the office back to schedule an appt with Dr. Celia Burnett if he wishes.

## 2023-05-01 ENCOUNTER — TELEPHONE (OUTPATIENT)
Dept: PRIMARY CARE CLINIC | Age: 48
End: 2023-05-01

## 2023-05-01 NOTE — TELEPHONE ENCOUNTER
Patient called stating he is having pain and numbness in left knee. Patient states it has been going on for a couple weeks; patient stated he injured it, and has not had any OTC for pain.

## 2023-05-02 ENCOUNTER — OFFICE VISIT (OUTPATIENT)
Dept: PRIMARY CARE CLINIC | Age: 48
End: 2023-05-02
Payer: MEDICAID

## 2023-05-02 VITALS
WEIGHT: 211 LBS | OXYGEN SATURATION: 96 % | BODY MASS INDEX: 28.62 KG/M2 | SYSTOLIC BLOOD PRESSURE: 154 MMHG | HEART RATE: 55 BPM | DIASTOLIC BLOOD PRESSURE: 104 MMHG

## 2023-05-02 DIAGNOSIS — M25.562 CHRONIC PAIN OF LEFT KNEE: Primary | ICD-10-CM

## 2023-05-02 DIAGNOSIS — G89.29 CHRONIC PAIN OF LEFT KNEE: Primary | ICD-10-CM

## 2023-05-02 DIAGNOSIS — M51.9 LUMBAR DISC DISEASE: ICD-10-CM

## 2023-05-02 DIAGNOSIS — R03.0 ELEVATED BLOOD PRESSURE READING IN OFFICE WITHOUT DIAGNOSIS OF HYPERTENSION: ICD-10-CM

## 2023-05-02 PROCEDURE — 99213 OFFICE O/P EST LOW 20 MIN: CPT | Performed by: NURSE PRACTITIONER

## 2023-05-02 SDOH — ECONOMIC STABILITY: FOOD INSECURITY: WITHIN THE PAST 12 MONTHS, THE FOOD YOU BOUGHT JUST DIDN'T LAST AND YOU DIDN'T HAVE MONEY TO GET MORE.: NEVER TRUE

## 2023-05-02 SDOH — ECONOMIC STABILITY: HOUSING INSECURITY
IN THE LAST 12 MONTHS, WAS THERE A TIME WHEN YOU DID NOT HAVE A STEADY PLACE TO SLEEP OR SLEPT IN A SHELTER (INCLUDING NOW)?: NO

## 2023-05-02 SDOH — ECONOMIC STABILITY: FOOD INSECURITY: WITHIN THE PAST 12 MONTHS, YOU WORRIED THAT YOUR FOOD WOULD RUN OUT BEFORE YOU GOT MONEY TO BUY MORE.: NEVER TRUE

## 2023-05-02 SDOH — ECONOMIC STABILITY: INCOME INSECURITY: HOW HARD IS IT FOR YOU TO PAY FOR THE VERY BASICS LIKE FOOD, HOUSING, MEDICAL CARE, AND HEATING?: NOT VERY HARD

## 2023-05-02 ASSESSMENT — PATIENT HEALTH QUESTIONNAIRE - PHQ9
SUM OF ALL RESPONSES TO PHQ QUESTIONS 1-9: 0
1. LITTLE INTEREST OR PLEASURE IN DOING THINGS: 0
2. FEELING DOWN, DEPRESSED OR HOPELESS: 0
SUM OF ALL RESPONSES TO PHQ9 QUESTIONS 1 & 2: 0

## 2023-05-02 ASSESSMENT — ENCOUNTER SYMPTOMS: BACK PAIN: 1

## 2023-05-02 NOTE — PATIENT INSTRUCTIONS
[address] [facility] ORTHOPEDICS AND SPORTS MEDICINE  [address] [address] [place] [address] [address] [address]  Dept: [phone]

## 2023-05-02 NOTE — PROGRESS NOTES
[address] PRIMARY CARE  [address] [address]  Dept: [phone]  Dept Fax: [phone]    Mary Byrne (:  1975) is a 41 y.o. male,Established patient, here for evaluation of the following chief complaint(s):  Knee Pain (Patient is here for pain and numbness in left knee. Patient states it has been going on for 3 weeks. Patient states he did injure it years ago.)         ASSESSMENT/PLAN:  1. Chronic pain of left knee  2. Lumbar disc disease  3. Elevated blood pressure reading in office without diagnosis of hypertension    Patient has access to [place] portal, did bring results of knee x-ray. Patient is agitated during visit, insisting results are of his MRI. Results dated 2023. Portal states he can take up to 36 hours for results to become available, patient only had MRI completed of the knee on 2023 making it less than 48 hours at this time. Did have patient sign release of records to obtain MRI once resulted. Encourage patient to contact his orthopedist, Dr. Genie Mcginnis as he wishes to discuss neck steps and results of MRI. Did review that his MRI of cervical and lumbar spine did show moderate to severe foraminal stenosis. I discussed with Sy Langley that this could be adding or exacerbating his knee pains, however the patient is not interested in discussing the back at this time. He feels his symptoms are manageable and just wishes to discuss his knee with Dr. Genie Mcginnis. Blood pressure is elevated last 2 visits, patient believes is due to knee pain. We will follow closely and discuss starting blood pressure medication for chronic hypertension if elevated at next visit. Return in about 3 months (around 2023) for blood pressure. Subjective   SUBJECTIVE/OBJECTIVE:  Mary Byrne presents today to discuss MRI results of his left knee.     Has xray of left knee on 23, followed by MRI on left knee

## 2023-06-21 DIAGNOSIS — M25.561 CHRONIC PAIN OF RIGHT KNEE: ICD-10-CM

## 2023-06-21 DIAGNOSIS — Z76.0 MEDICATION REFILL: ICD-10-CM

## 2023-06-21 DIAGNOSIS — G89.29 CHRONIC PAIN OF RIGHT KNEE: ICD-10-CM

## 2023-06-21 RX ORDER — TADALAFIL 20 MG/1
TABLET ORAL
Qty: 20 TABLET | Refills: 2 | Status: SHIPPED | OUTPATIENT
Start: 2023-06-21

## 2023-06-21 RX ORDER — TADALAFIL 20 MG/1
TABLET ORAL
Qty: 20 TABLET | Refills: 2 | OUTPATIENT
Start: 2023-06-21

## 2023-06-21 NOTE — TELEPHONE ENCOUNTER
Last seen 5/2/23    Next Visit Date:  Future Appointments   Date Time Provider Marie Rosen   8/2/2023 10:00 AM [facility] Maintenance   Topic Date Due    COVID-19 Vaccine (1) Never done    DTaP/Tdap/Td vaccine (1 - Tdap) Never done    Colorectal Cancer Screen  Never done    Diabetes screen  01/20/2024    Depression Screen  05/02/2024    Lipids  01/20/2026    Flu vaccine  Completed    Hepatitis C screen  Completed    HIV screen  Completed    Hepatitis A vaccine  Aged Out    Hib vaccine  Aged Out    Meningococcal (ACWY) vaccine  Aged Out    Pneumococcal 0-64 years Vaccine  Aged Out    Hepatitis B vaccine  Discontinued       No results found for: LABA1C          ( goal A1C is < 7)   No results found for: LABMICR  LDL Cholesterol (mg/dL)   Date Value   01/20/2021 104       (goal LDL is <100)   AST (U/L)   Date Value   01/20/2021 30     ALT (U/L)   Date Value   01/20/2021 10     BUN (mg/dL)   Date Value   01/20/2021 24 (H)     BP Readings from Last 3 Encounters:   05/02/23 (!) 154/104   02/09/23 (!) 173/119   10/07/22 118/80          (goal 120/80)    All Future Testing planned in [facility]  Lab Frequency Next Occurrence   XR KNEE RIGHT (3 VIEWS) Once 10/07/2022               Patient Active Problem List:     Knee pain, left     Lumbar disc disease     Right shoulder pain     Neck pain on right side     Family planning

## 2023-06-21 NOTE — TELEPHONE ENCOUNTER
Last Visit:  5/2/2023     Next Visit Date:  Future Appointments   Date Time Provider [address]   8/2/2023 10:00 AM Fabiola Guzman, APRN - [address] Maintenance   Topic Date Due    COVID-19 Vaccine (1) Never done    DTaP/Tdap/Td vaccine (1 - Tdap) Never done    Colorectal Cancer Screen  Never done    Diabetes screen  01/20/2024    Depression Screen  05/02/2024    Lipids  01/20/2026    Flu vaccine  Completed    Hepatitis C screen  Completed    HIV screen  Completed    Hepatitis A vaccine  Aged Out    Hib vaccine  Aged Out    Meningococcal (ACWY) vaccine  Aged Out    Pneumococcal 0-64 years Vaccine  Aged Out    Hepatitis B vaccine  Discontinued       No results found for: LABA1C          ( goal A1C is < 7)   No results found for: LABMICR  LDL Cholesterol (mg/dL)   Date Value   01/20/2021 104   02/26/2020 98       (goal LDL is <100)   AST (U/L)   Date Value   01/20/2021 30     ALT (U/L)   Date Value   01/20/2021 10     BUN (mg/dL)   Date Value   01/20/2021 24 (H)     BP Readings from Last 3 Encounters:   05/02/23 (!) 154/104   02/09/23 (!) 173/119   10/07/22 118/80          (goal 120/80)    All Future Testing planned in [facility]  Lab Frequency Next Occurrence   XR KNEE RIGHT (3 VIEWS) Once 10/07/2022               Patient Active Problem List:     Knee pain, left     Lumbar disc disease     Right shoulder pain     Neck pain on right side     Family planning

## 2023-08-04 DIAGNOSIS — M25.561 CHRONIC PAIN OF RIGHT KNEE: ICD-10-CM

## 2023-08-04 DIAGNOSIS — G89.29 CHRONIC PAIN OF RIGHT KNEE: ICD-10-CM

## 2023-08-19 DIAGNOSIS — Z76.0 MEDICATION REFILL: ICD-10-CM

## 2023-08-21 RX ORDER — TADALAFIL 20 MG/1
TABLET ORAL
Qty: 20 TABLET | Refills: 2 | OUTPATIENT
Start: 2023-08-21

## 2023-08-24 ENCOUNTER — TELEPHONE (OUTPATIENT)
Dept: PRIMARY CARE CLINIC | Age: 48
End: 2023-08-24

## 2023-08-24 DIAGNOSIS — Z76.0 MEDICATION REFILL: ICD-10-CM

## 2023-08-24 RX ORDER — TADALAFIL 20 MG/1
TABLET ORAL
Qty: 20 TABLET | Refills: 2 | Status: SHIPPED | OUTPATIENT
Start: 2023-08-24

## 2023-09-06 ENCOUNTER — TELEPHONE (OUTPATIENT)
Dept: PRIMARY CARE CLINIC | Age: 48
End: 2023-09-06

## 2023-09-06 NOTE — TELEPHONE ENCOUNTER
Called patient left msg. Needs to switch 9/7/23 appt with scott to a virtual if he has the ability.  If not will need to reschedule for another day in office

## 2023-09-07 ENCOUNTER — TELEPHONE (OUTPATIENT)
Dept: PRIMARY CARE CLINIC | Age: 48
End: 2023-09-07

## 2023-09-07 NOTE — TELEPHONE ENCOUNTER
Patient came in for appt . Told him tried to call in regards to appt provider not in office. Offered a virtual visit he denies. Asked did he need med refills he states he didn't.  Patient told me he will call back later to reschedule

## 2023-10-13 DIAGNOSIS — Z76.0 MEDICATION REFILL: ICD-10-CM

## 2023-10-13 RX ORDER — TADALAFIL 20 MG/1
TABLET ORAL
Qty: 20 TABLET | Refills: 2 | Status: SHIPPED | OUTPATIENT
Start: 2023-10-13

## 2023-12-07 DIAGNOSIS — Z76.0 MEDICATION REFILL: ICD-10-CM

## 2023-12-07 RX ORDER — TADALAFIL 20 MG/1
TABLET ORAL
Qty: 20 TABLET | Refills: 2 | Status: SHIPPED | OUTPATIENT
Start: 2023-12-07

## 2024-02-16 DIAGNOSIS — G89.29 CHRONIC PAIN OF RIGHT KNEE: ICD-10-CM

## 2024-02-16 DIAGNOSIS — Z76.0 MEDICATION REFILL: ICD-10-CM

## 2024-02-16 DIAGNOSIS — M25.561 CHRONIC PAIN OF RIGHT KNEE: ICD-10-CM

## 2024-02-19 RX ORDER — TADALAFIL 20 MG/1
TABLET ORAL
Qty: 20 TABLET | Refills: 0 | Status: SHIPPED | OUTPATIENT
Start: 2024-02-19

## 2024-03-23 DIAGNOSIS — Z76.0 MEDICATION REFILL: ICD-10-CM

## 2024-03-25 RX ORDER — TADALAFIL 20 MG/1
TABLET ORAL
Qty: 20 TABLET | Refills: 0 | Status: SHIPPED | OUTPATIENT
Start: 2024-03-25

## 2024-04-16 ENCOUNTER — OFFICE VISIT (OUTPATIENT)
Dept: PRIMARY CARE CLINIC | Age: 49
End: 2024-04-16
Payer: MEDICAID

## 2024-04-16 VITALS
OXYGEN SATURATION: 100 % | DIASTOLIC BLOOD PRESSURE: 79 MMHG | HEIGHT: 72 IN | BODY MASS INDEX: 27.9 KG/M2 | SYSTOLIC BLOOD PRESSURE: 137 MMHG | WEIGHT: 206 LBS | HEART RATE: 96 BPM

## 2024-04-16 DIAGNOSIS — M25.561 CHRONIC PAIN OF RIGHT KNEE: Primary | ICD-10-CM

## 2024-04-16 DIAGNOSIS — N52.8 OTHER MALE ERECTILE DYSFUNCTION: ICD-10-CM

## 2024-04-16 DIAGNOSIS — G89.29 CHRONIC PAIN OF RIGHT KNEE: Primary | ICD-10-CM

## 2024-04-16 PROCEDURE — 99214 OFFICE O/P EST MOD 30 MIN: CPT | Performed by: NURSE PRACTITIONER

## 2024-04-16 RX ORDER — TADALAFIL 20 MG/1
TABLET ORAL
Qty: 20 TABLET | Refills: 2 | Status: SHIPPED | OUTPATIENT
Start: 2024-04-16

## 2024-04-16 ASSESSMENT — PATIENT HEALTH QUESTIONNAIRE - PHQ9
SUM OF ALL RESPONSES TO PHQ QUESTIONS 1-9: 0
1. LITTLE INTEREST OR PLEASURE IN DOING THINGS: NOT AT ALL
SUM OF ALL RESPONSES TO PHQ QUESTIONS 1-9: 0
2. FEELING DOWN, DEPRESSED OR HOPELESS: NOT AT ALL
SUM OF ALL RESPONSES TO PHQ QUESTIONS 1-9: 0
SUM OF ALL RESPONSES TO PHQ9 QUESTIONS 1 & 2: 0
SUM OF ALL RESPONSES TO PHQ QUESTIONS 1-9: 0

## 2024-04-16 ASSESSMENT — ENCOUNTER SYMPTOMS
DIARRHEA: 0
BACK PAIN: 1
WHEEZING: 0
SHORTNESS OF BREATH: 0
TROUBLE SWALLOWING: 0
VOMITING: 0
ABDOMINAL PAIN: 0
BLOOD IN STOOL: 0
CHEST TIGHTNESS: 0

## 2024-04-16 NOTE — PROGRESS NOTES
[facility]  [facility] PRIMARY CARE  [facility] [facility] MAIN FLOOR  [facility] [address]  Dept: [phone]  Dept Fax: [phone]    Benigno Burnett (:  1975) is a 48 y.o. male,Established patient, here for evaluation of the following chief complaint(s):  Follow-up and Medication Refill (Med refills)    Benigno Burnett is a 48-year-old male here today for routine follow-up, last seen on 2023.  Over the last year he has been under the care of orthopedics, Dr. Carty.  He is status post left knee quadricep tendon repair on 10/6/2023.  He also does completed physical therapy for the rupture of left quadricep tendon     ASSESSMENT/PLAN:  1. Chronic pain of right knee  -     diclofenac sodium (VOLTAREN) 1 % GEL; Apply 4 grams TO THE AFFECTED AREA FOUR TIMES DAILY AS NEEDED FOR PAIN, Disp-100 g, R-2, Normal  2. Other male erectile dysfunction    Health maintenance reviewed, [facility] does [facility]  Refills provided today    Return in about 1 year (around 2025).         Subjective   SUBJECTIVE/OBJECTIVE:  [facility]    [facility] [place]        Review of Systems   Constitutional:  Negative for appetite change, fever and unexpected weight change.   HENT:  Negative for hearing loss and trouble swallowing.    Eyes:  Negative for visual disturbance.   Respiratory:  Negative for chest tightness, shortness of breath and wheezing.    Cardiovascular:  Negative for chest pain and palpitations.   Gastrointestinal:  Negative for abdominal pain, blood in stool, diarrhea and vomiting.   Endocrine: Negative for polydipsia and polyuria.   Genitourinary:  Negative for difficulty urinating, frequency and hematuria.   Musculoskeletal:  Positive for arthralgias, back pain and neck pain. Negative for joint swelling and myalgias.   Skin:  Negative for

## 2024-07-03 DIAGNOSIS — G89.29 CHRONIC PAIN OF RIGHT KNEE: ICD-10-CM

## 2024-07-03 DIAGNOSIS — Z76.0 ENCOUNTER FOR ISSUE OF REPEAT PRESCRIPTION: ICD-10-CM

## 2024-07-03 DIAGNOSIS — M25.561 CHRONIC PAIN OF RIGHT KNEE: ICD-10-CM

## 2024-07-05 RX ORDER — TADALAFIL 20 MG/1
TABLET ORAL
Qty: 20 TABLET | Refills: 2 | Status: SHIPPED | OUTPATIENT
Start: 2024-07-05

## 2024-09-06 DIAGNOSIS — Z76.0 ENCOUNTER FOR ISSUE OF REPEAT PRESCRIPTION: ICD-10-CM

## 2024-09-06 RX ORDER — TADALAFIL 20 MG/1
20 TABLET ORAL DAILY PRN
Qty: 20 TABLET | Refills: 2 | Status: SHIPPED | OUTPATIENT
Start: 2024-09-06 | End: 2025-09-06

## 2024-11-20 ENCOUNTER — OFFICE VISIT (OUTPATIENT)
Dept: PRIMARY CARE CLINIC | Age: 49
End: 2024-11-20
Payer: MEDICAID

## 2024-11-20 VITALS
SYSTOLIC BLOOD PRESSURE: 170 MMHG | OXYGEN SATURATION: 100 % | HEART RATE: 62 BPM | WEIGHT: 201 LBS | DIASTOLIC BLOOD PRESSURE: 102 MMHG | HEIGHT: 72 IN | TEMPERATURE: 98.2 F | BODY MASS INDEX: 27.22 KG/M2

## 2024-11-20 DIAGNOSIS — Z13.220 LIPID SCREENING: ICD-10-CM

## 2024-11-20 DIAGNOSIS — M48.061 LUMBAR FORAMINAL STENOSIS: ICD-10-CM

## 2024-11-20 DIAGNOSIS — M48.02 FORAMINAL STENOSIS OF CERVICAL REGION: ICD-10-CM

## 2024-11-20 DIAGNOSIS — R03.0 ELEVATED BLOOD PRESSURE READING IN OFFICE WITHOUT DIAGNOSIS OF HYPERTENSION: ICD-10-CM

## 2024-11-20 DIAGNOSIS — Z13.1 DIABETES MELLITUS SCREENING: ICD-10-CM

## 2024-11-20 DIAGNOSIS — Z00.00 ENCOUNTER FOR WELL ADULT EXAM WITHOUT ABNORMAL FINDINGS: Primary | ICD-10-CM

## 2024-11-20 PROCEDURE — 99396 PREV VISIT EST AGE 40-64: CPT | Performed by: NURSE PRACTITIONER

## 2024-11-20 SDOH — ECONOMIC STABILITY: INCOME INSECURITY: HOW HARD IS IT FOR YOU TO PAY FOR THE VERY BASICS LIKE FOOD, HOUSING, MEDICAL CARE, AND HEATING?: NOT HARD AT ALL

## 2024-11-20 SDOH — ECONOMIC STABILITY: FOOD INSECURITY: WITHIN THE PAST 12 MONTHS, THE FOOD YOU BOUGHT JUST DIDN'T LAST AND YOU DIDN'T HAVE MONEY TO GET MORE.: NEVER TRUE

## 2024-11-20 SDOH — ECONOMIC STABILITY: FOOD INSECURITY: WITHIN THE PAST 12 MONTHS, YOU WORRIED THAT YOUR FOOD WOULD RUN OUT BEFORE YOU GOT MONEY TO BUY MORE.: NEVER TRUE

## 2024-11-20 ASSESSMENT — ENCOUNTER SYMPTOMS
WHEEZING: 0
ABDOMINAL PAIN: 0
SHORTNESS OF BREATH: 0
BACK PAIN: 1
DIARRHEA: 0
BLOOD IN STOOL: 0
TROUBLE SWALLOWING: 0
VOMITING: 0

## 2024-11-20 NOTE — PROGRESS NOTES
tone.      Coordination: Coordination normal.      Gait: Gait normal.      Comments: No foot drop   Psychiatric:         Attention and Perception: Attention normal.         Mood and Affect: Mood is anxious. Affect is not inappropriate.         Speech: Speech normal.         Behavior: Behavior normal. Behavior is cooperative.         Thought Content: Thought content normal.         Judgment: Judgment normal.             Personalized Preventive Plan  Current Health Maintenance Status  Immunization History   Administered Date(s) Administered    COVID-19, PFIZER, (age 12y+), IM, 30mcg/0.3mL 10/13/2023, 10/15/2024    DTaP 10/20/2023    Hepatitis B 11/21/2013, 03/19/2014    Influenza, AFLURIA (age 3 y+), FLUZONE, (age 6 mo+), Quadv MDV, 0.5mL 08/17/2017    Influenza, FLUCELVAX, (age 6 mo+), MDCK, Quadv PF, 0.5mL 10/07/2022        Health Maintenance Due   Topic Date Due    Hepatitis B vaccine (3 of 3 - 19+ 3-dose series) 05/21/2014    Colorectal Cancer Screen  Never done    Diabetes screen  01/20/2024      Recommendations for Preventive Services Due: see orders and patient instructions/AVS.

## 2024-12-21 DIAGNOSIS — Z76.0 ENCOUNTER FOR ISSUE OF REPEAT PRESCRIPTION: ICD-10-CM

## 2024-12-23 DIAGNOSIS — M25.561 CHRONIC PAIN OF RIGHT KNEE: ICD-10-CM

## 2024-12-23 DIAGNOSIS — Z76.0 ENCOUNTER FOR ISSUE OF REPEAT PRESCRIPTION: ICD-10-CM

## 2024-12-23 DIAGNOSIS — G89.29 CHRONIC PAIN OF RIGHT KNEE: ICD-10-CM

## 2024-12-23 RX ORDER — TADALAFIL 20 MG/1
TABLET ORAL
Qty: 20 TABLET | Refills: 2 | OUTPATIENT
Start: 2024-12-23

## 2024-12-23 RX ORDER — TADALAFIL 20 MG/1
20 TABLET ORAL DAILY PRN
Qty: 20 TABLET | Refills: 2 | Status: SHIPPED | OUTPATIENT
Start: 2024-12-23 | End: 2025-12-23

## 2025-01-16 ENCOUNTER — HOSPITAL ENCOUNTER (OUTPATIENT)
Dept: MRI IMAGING | Age: 50
Discharge: HOME | End: 2025-01-18
Payer: MEDICAID

## 2025-01-16 DIAGNOSIS — M48.02 FORAMINAL STENOSIS OF CERVICAL REGION: ICD-10-CM

## 2025-01-16 DIAGNOSIS — M48.061 LUMBAR FORAMINAL STENOSIS: ICD-10-CM

## 2025-01-16 PROCEDURE — 72141 MRI NECK SPINE W/O DYE: CPT

## 2025-01-16 PROCEDURE — 72148 MRI LUMBAR SPINE W/O DYE: CPT

## 2025-01-27 DIAGNOSIS — M48.02 FORAMINAL STENOSIS OF CERVICAL REGION: Primary | ICD-10-CM

## 2025-01-27 DIAGNOSIS — M25.561 CHRONIC PAIN OF RIGHT KNEE: ICD-10-CM

## 2025-01-27 DIAGNOSIS — G89.29 CHRONIC PAIN OF RIGHT KNEE: ICD-10-CM

## 2025-01-27 DIAGNOSIS — M48.061 LUMBAR FORAMINAL STENOSIS: ICD-10-CM

## 2025-01-29 ENCOUNTER — OFFICE VISIT (OUTPATIENT)
Dept: NEUROSURGERY | Age: 50
End: 2025-01-29
Payer: MEDICAID

## 2025-01-29 VITALS
WEIGHT: 194 LBS | SYSTOLIC BLOOD PRESSURE: 143 MMHG | BODY MASS INDEX: 26.28 KG/M2 | DIASTOLIC BLOOD PRESSURE: 84 MMHG | HEART RATE: 66 BPM | HEIGHT: 72 IN

## 2025-01-29 DIAGNOSIS — M47.22 CERVICAL SPONDYLOSIS WITH RADICULOPATHY: Primary | ICD-10-CM

## 2025-01-29 DIAGNOSIS — M47.26 OTHER SPONDYLOSIS WITH RADICULOPATHY, LUMBAR REGION: ICD-10-CM

## 2025-01-29 PROCEDURE — 99204 OFFICE O/P NEW MOD 45 MIN: CPT

## 2025-01-29 NOTE — PROGRESS NOTES
[facility]OS[facility] [place]  [address]  [address]  [address] GR[address] FLOOR, [address]  [facility] [address]  Dept: [phone]    Patient:  Benigno Burnett  YOB: 1975  Date: 1/28/25    The patient is a 49 y.o. male who presents today for consult of the following problems:     Chief Complaint   Patient presents with    New Patient     Referral for Foraminal stenosis of cervical region, Lumbar foraminal stenosis. Patient states he will feel numbness and tingling that shoots from his neck all the way down his spine even to his legs.          HPI:     Benigno Burnett is a 49 y.o. male on whom neurosurgical consultation was requested by Elizabeth Wilson APRN - CNP for management of radiating neck and back pain.    Patient presents with pain that started in the lower back a few years ago then progressively worsened to the legs. Now radiating pain the in neck and left upper and lower extremities.     Neck  The patient reports neck pain that began approximately 3-4 years ago and has progressively worsened to become constant, with intermittent flare-ups. The pain is described as sharp, tingling, and accompanied by a swelling sensation. It primarily radiates to the left arm, affecting the posterior shoulder, posterior elbow, and hand, with occasional but rare radiation to the right arm. The pain is aggravated by sleeping, prolonged activity, including walking, sitting, or excessive movement. It is somewhat alleviated with movement, stretching, and exercise. The severity ranges from 4/10 to 10/10. The patient also experiences balance difficulties, weakness, and restricted range of motion due to pain, noting that neck pain is more severe than arm pain.     Back  The patient has been experiencing persistent left lower back pain for several years, with fluctuating severity. The pain is constant and described as sharp, tingling, throbbing, and

## 2025-01-30 ASSESSMENT — ENCOUNTER SYMPTOMS: BACK PAIN: 1

## 2025-02-16 ENCOUNTER — HOSPITAL ENCOUNTER (EMERGENCY)
Facility: CLINIC | Age: 50
Discharge: HOME | End: 2025-02-16
Attending: EMERGENCY MEDICINE
Payer: MEDICAID

## 2025-02-16 ENCOUNTER — APPOINTMENT (OUTPATIENT)
Dept: CT IMAGING | Facility: CLINIC | Age: 50
End: 2025-02-16
Payer: MEDICAID

## 2025-02-16 VITALS
BODY MASS INDEX: 26.01 KG/M2 | TEMPERATURE: 98.3 F | HEIGHT: 72 IN | WEIGHT: 192 LBS | HEART RATE: 80 BPM | OXYGEN SATURATION: 97 % | DIASTOLIC BLOOD PRESSURE: 107 MMHG | RESPIRATION RATE: 17 BRPM | SYSTOLIC BLOOD PRESSURE: 200 MMHG

## 2025-02-16 DIAGNOSIS — S09.90XA CLOSED HEAD INJURY, INITIAL ENCOUNTER: Primary | ICD-10-CM

## 2025-02-16 PROCEDURE — 6360000002 HC RX W HCPCS: Performed by: EMERGENCY MEDICINE

## 2025-02-16 PROCEDURE — 70450 CT HEAD/BRAIN W/O DYE: CPT

## 2025-02-16 PROCEDURE — 96372 THER/PROPH/DIAG INJ SC/IM: CPT

## 2025-02-16 PROCEDURE — 99284 EMERGENCY DEPT VISIT MOD MDM: CPT

## 2025-02-16 RX ORDER — KETOROLAC TROMETHAMINE 30 MG/ML
30 INJECTION, SOLUTION INTRAMUSCULAR; INTRAVENOUS ONCE
Status: COMPLETED | OUTPATIENT
Start: 2025-02-16 | End: 2025-02-16

## 2025-02-16 RX ADMIN — KETOROLAC TROMETHAMINE 30 MG: 30 INJECTION, SOLUTION INTRAMUSCULAR; INTRAVENOUS at 14:22

## 2025-02-16 ASSESSMENT — LIFESTYLE VARIABLES
HOW MANY STANDARD DRINKS CONTAINING ALCOHOL DO YOU HAVE ON A TYPICAL DAY: PATIENT DOES NOT DRINK
HOW OFTEN DO YOU HAVE A DRINK CONTAINING ALCOHOL: NEVER

## 2025-02-16 ASSESSMENT — PAIN - FUNCTIONAL ASSESSMENT: PAIN_FUNCTIONAL_ASSESSMENT: 0-10

## 2025-02-16 ASSESSMENT — PAIN DESCRIPTION - LOCATION: LOCATION: GENERALIZED

## 2025-02-16 ASSESSMENT — PAIN SCALES - GENERAL: PAINLEVEL_OUTOF10: 6
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
## 2025-02-21 ENCOUNTER — OFFICE VISIT (OUTPATIENT)
Dept: PRIMARY CARE CLINIC | Age: 50
End: 2025-02-21
Payer: MEDICAID

## 2025-02-21 VITALS
TEMPERATURE: 98.1 F | HEART RATE: 83 BPM | BODY MASS INDEX: 26.04 KG/M2 | DIASTOLIC BLOOD PRESSURE: 105 MMHG | SYSTOLIC BLOOD PRESSURE: 150 MMHG | OXYGEN SATURATION: 100 % | WEIGHT: 192 LBS

## 2025-02-21 DIAGNOSIS — M25.361 KNEE INSTABILITY, RIGHT: Primary | ICD-10-CM

## 2025-02-21 DIAGNOSIS — Z76.0 ENCOUNTER FOR ISSUE OF REPEAT PRESCRIPTION: ICD-10-CM
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
## 2025-02-21 PROCEDURE — 99213 OFFICE O/P EST LOW 20 MIN: CPT | Performed by: NURSE PRACTITIONER

## 2025-02-21 RX ORDER — TADALAFIL 20 MG/1
20 TABLET ORAL DAILY PRN
Qty: 20 TABLET | Refills: 2 | Status: SHIPPED | OUTPATIENT
Start: 2025-02-21 | End: 2026-02-21

## 2025-02-21 RX ORDER — LIDOCAINE 50 MG/G
OINTMENT TOPICAL
Qty: 50 G | Refills: 0 | Status: SHIPPED | OUTPATIENT
Start: 2025-02-21

## 2025-02-21 SDOH — ECONOMIC STABILITY: FOOD INSECURITY: WITHIN THE PAST 12 MONTHS, YOU WORRIED THAT YOUR FOOD WOULD RUN OUT BEFORE YOU GOT MONEY TO BUY MORE.: NEVER TRUE

## 2025-02-21 SDOH — ECONOMIC STABILITY: FOOD INSECURITY: WITHIN THE PAST 12 MONTHS, THE FOOD YOU BOUGHT JUST DIDN'T LAST AND YOU DIDN'T HAVE MONEY TO GET MORE.: NEVER TRUE
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
## 2025-03-18 ENCOUNTER — INITIAL CONSULT (OUTPATIENT)
Dept: PAIN MANAGEMENT | Age: 50
End: 2025-03-18
Payer: MEDICAID

## 2025-03-18 VITALS — WEIGHT: 192 LBS | BODY MASS INDEX: 26.01 KG/M2 | HEIGHT: 72 IN

## 2025-03-18 DIAGNOSIS — M48.02 CERVICAL SPINAL STENOSIS: Primary | ICD-10-CM

## 2025-03-18 DIAGNOSIS — M47.817 LUMBOSACRAL SPONDYLOSIS WITHOUT MYELOPATHY: ICD-10-CM

## 2025-03-18 DIAGNOSIS — M47.812 CERVICAL SPONDYLOSIS: ICD-10-CM

## 2025-03-18 DIAGNOSIS — M54.51 VERTEBROGENIC LOW BACK PAIN: ICD-10-CM

## 2025-03-18 PROCEDURE — 99205 OFFICE O/P NEW HI 60 MIN: CPT | Performed by: ANESTHESIOLOGY

## 2025-03-18 ASSESSMENT — ENCOUNTER SYMPTOMS
EYES NEGATIVE: 1
BACK PAIN: 1
ALLERGIC/IMMUNOLOGIC NEGATIVE: 1
GASTROINTESTINAL NEGATIVE: 1
RESPIRATORY NEGATIVE: 1

## 2025-03-18 NOTE — PROGRESS NOTES
The patient is a 49 y.o.Non- / non  male.    Chief Complaint   Patient presents with    New Patient     Neck & back pain        HPI    Pain History    This is a 49-year-old man complaining of chronic neck and low back pain    Onset of symptoms 25+ years ago related to work injury    Neck pain  Located over the axial cervical spine describes it as constant aching nagging stiffness aggravates with neck movement  Reports radiation of pain down both arms left side more than right  Associated with left arm numbness and paresthesia  Denies any loss of bladder or bowel control  Have tried physical therapy  No previous cervical spine interventional procedure or surgical history  Had recent cervical spine MRI that showed multilevel cervical spinal stenosis without myelopathy  Was recently evaluated by neurosurgery and is referred for repeat course of physical therapy as well as for consideration of interventional procedures    Back pain  Chronic going on for 10+ years located in the lower lumbar area and midline and paramidline region, reports intermittent radiation of pain down leg left side more than right all the way sometimes to the foot associated with intermittent left leg numbness and paresthesia  No changes in bladder or bowel control  Back pain is constant aching stiffness aggravates with back movement interfering with quality of life  Symptoms are progressively been worsening  Patient report multiple courses of physical therapy  Most recently in October 2024 without much benefit  Have tried all muscle relaxants and NSAIDs  Currently on tramadol  No previous lumbar spine interventional procedure or surgical history  Had recent MRI lumbar spine that showed multilevel lumbar disc degenerative changes and facet arthropathy without any significant spinal canal stenosis  Pain score today: 7/8 neck/ back ; 10 knee  1. Location: neck & back     2. Radiation: both legs  3. Character: nagging, penetrating,

## 2025-03-24 ENCOUNTER — TELEPHONE (OUTPATIENT)
Dept: PAIN MANAGEMENT | Age: 50
End: 2025-03-24

## 2025-03-24 NOTE — TELEPHONE ENCOUNTER
Called patient to see if he would be interesting in moving his procedure from 4/16/25 to 4/9/25.  Patient declined due to transportation.
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
## 2025-03-28 DIAGNOSIS — M25.361 KNEE INSTABILITY, RIGHT: ICD-10-CM

## 2025-03-31 RX ORDER — LIDOCAINE 50 MG/G
OINTMENT TOPICAL PRN
Qty: 50 G | Refills: 0 | Status: SHIPPED | OUTPATIENT
Start: 2025-03-31

## 2025-04-16 ENCOUNTER — HOSPITAL ENCOUNTER (OUTPATIENT)
Dept: PAIN MANAGEMENT | Facility: CLINIC | Age: 50
Discharge: HOME | End: 2025-04-16
Payer: MEDICAID

## 2025-04-16 VITALS
BODY MASS INDEX: 26.01 KG/M2 | DIASTOLIC BLOOD PRESSURE: 99 MMHG | WEIGHT: 192 LBS | HEART RATE: 66 BPM | TEMPERATURE: 97.9 F | SYSTOLIC BLOOD PRESSURE: 146 MMHG | OXYGEN SATURATION: 97 % | RESPIRATION RATE: 12 BRPM | HEIGHT: 72 IN

## 2025-04-16 DIAGNOSIS — R52 PAIN MANAGEMENT: ICD-10-CM

## 2025-04-16 DIAGNOSIS — M47.812 CERVICAL SPONDYLOSIS: Primary | ICD-10-CM

## 2025-04-16 DIAGNOSIS — M48.02 CERVICAL SPINAL STENOSIS: ICD-10-CM

## 2025-04-16 PROCEDURE — 2500000003 HC RX 250 WO HCPCS: Performed by: ANESTHESIOLOGY

## 2025-04-16 PROCEDURE — 62321 NJX INTERLAMINAR CRV/THRC: CPT

## 2025-04-16 PROCEDURE — 6360000004 HC RX CONTRAST MEDICATION: Performed by: ANESTHESIOLOGY

## 2025-04-16 PROCEDURE — 6360000002 HC RX W HCPCS: Performed by: ANESTHESIOLOGY

## 2025-04-16 PROCEDURE — 62321 NJX INTERLAMINAR CRV/THRC: CPT | Performed by: ANESTHESIOLOGY

## 2025-04-16 RX ORDER — LIDOCAINE HYDROCHLORIDE 10 MG/ML
INJECTION, SOLUTION EPIDURAL; INFILTRATION; INTRACAUDAL; PERINEURAL
Status: COMPLETED | OUTPATIENT
Start: 2025-04-16 | End: 2025-04-16

## 2025-04-16 RX ORDER — DEXAMETHASONE SODIUM PHOSPHATE 10 MG/ML
INJECTION, SOLUTION INTRAMUSCULAR; INTRAVENOUS
Status: COMPLETED | OUTPATIENT
Start: 2025-04-16 | End: 2025-04-16

## 2025-04-16 RX ORDER — ACETAMINOPHEN 500 MG
1000 TABLET ORAL EVERY 6 HOURS PRN
COMMUNITY

## 2025-04-16 RX ORDER — SODIUM CHLORIDE 0.9 % (FLUSH) 0.9 %
SYRINGE (ML) INJECTION
Status: COMPLETED | OUTPATIENT
Start: 2025-04-16 | End: 2025-04-16

## 2025-04-16 RX ADMIN — LIDOCAINE HYDROCHLORIDE 4 ML: 10 INJECTION, SOLUTION EPIDURAL; INFILTRATION; INTRACAUDAL; PERINEURAL at 08:15

## 2025-04-16 RX ADMIN — IOHEXOL 3 ML: 180 INJECTION INTRAVENOUS at 08:17

## 2025-04-16 RX ADMIN — Medication 5 ML: at 08:22

## 2025-04-16 RX ADMIN — DEXAMETHASONE SODIUM PHOSPHATE 10 MG: 10 INJECTION INTRAMUSCULAR; INTRAVENOUS at 08:22

## 2025-04-16 ASSESSMENT — PAIN DESCRIPTION - ONSET: ONSET: ON-GOING

## 2025-04-16 ASSESSMENT — PAIN - FUNCTIONAL ASSESSMENT
PAIN_FUNCTIONAL_ASSESSMENT: PREVENTS OR INTERFERES SOME ACTIVE ACTIVITIES AND ADLS
PAIN_FUNCTIONAL_ASSESSMENT: 0-10

## 2025-04-16 ASSESSMENT — PAIN DESCRIPTION - LOCATION: LOCATION: NECK

## 2025-04-16 ASSESSMENT — PAIN DESCRIPTION - DIRECTION: RADIATING_TOWARDS: LEFT SHOULDER AND ARM

## 2025-04-16 ASSESSMENT — PAIN DESCRIPTION - DESCRIPTORS: DESCRIPTORS: TINGLING;THROBBING;ACHING

## 2025-04-16 ASSESSMENT — PAIN DESCRIPTION - FREQUENCY: FREQUENCY: CONTINUOUS

## 2025-04-16 ASSESSMENT — PAIN DESCRIPTION - ORIENTATION: ORIENTATION: LEFT

## 2025-04-16 ASSESSMENT — PAIN DESCRIPTION - PAIN TYPE: TYPE: CHRONIC PAIN

## 2025-04-16 ASSESSMENT — PAIN SCALES - GENERAL: PAINLEVEL_OUTOF10: 7

## 2025-04-16 NOTE — OP NOTE
Preoperative Diagnosis: Cervical disc herniation and radiculitis  Postoperative Diagnosis:  Cervical disc herniation and radiculitis    Procedure Performed:  Cervical epidural steroid injection under fluoroscopy guidance    BLOOD LOSS: NONE    Procedure:      The Patient was seen in the preop area, chart was reviewed, informed consent was obtained. Patient was taken to procedure room and was placed in prone position. Vital signs were monitored through out the  Procedure. A time out was completed. The site was prepped and draped in sterile manner.     The target point was marked at The interlaminar space at C7/T1 . Skin and deep tissues were anesthetized with 1 % lidocaine.A  19-gauge Tuohy epidural needlele was advanced  under fluoroscopy guidance in AP view. Epidural space was identified using WALESKA technique. A 19 G catheter was threaded up in epidural space for 2 levels. Position was confirmed in Lateral view.   Then after negative aspiration contrast dye was injected with live fluoroscopy in AP views that showed  spread of the contrast in the epidural space  and no vascular runoff or intrathecal spread. Finally 5 ml of treatment solution containing 4 ml of PF NS and 1 ml of dexamethasone 10 mg / ml was injected.  The needle was removed and a Band-Aid was placed over the needle  insertion site.  The patient's vital signs remained stable and the patient tolerated the procedure well.

## 2025-04-16 NOTE — DISCHARGE INSTRUCTIONS

## 2025-04-16 NOTE — INTERVAL H&P NOTE
Update History & Physical    The patient's History and Physical of March 18, 2025 was reviewed with the patient and I examined the patient. There was no change. The surgical site was confirmed by the patient and me.     Plan: The risks, benefits, expected outcome, and alternative to the recommended procedure have been discussed with the patient. Patient understands and wants to proceed with the procedure.     ASA 2  MP 2    Electronically signed by Agustín Cramer MD on 4/16/2025 at 8:04 AM

## 2025-04-22 ENCOUNTER — OFFICE VISIT (OUTPATIENT)
Dept: PRIMARY CARE CLINIC | Age: 50
End: 2025-04-22
Payer: MEDICAID

## 2025-04-22 VITALS
OXYGEN SATURATION: 100 % | WEIGHT: 202 LBS | BODY MASS INDEX: 27.4 KG/M2 | TEMPERATURE: 97.5 F | DIASTOLIC BLOOD PRESSURE: 86 MMHG | HEART RATE: 62 BPM | SYSTOLIC BLOOD PRESSURE: 164 MMHG

## 2025-04-22 DIAGNOSIS — M48.02 FORAMINAL STENOSIS OF CERVICAL REGION: ICD-10-CM

## 2025-04-22 DIAGNOSIS — S83.231A COMPLEX TEAR OF MEDIAL MENISCUS OF RIGHT KNEE AS CURRENT INJURY, INITIAL ENCOUNTER: Primary | ICD-10-CM

## 2025-04-22 DIAGNOSIS — R03.0 ELEVATED BLOOD PRESSURE READING IN OFFICE WITHOUT DIAGNOSIS OF HYPERTENSION: ICD-10-CM

## 2025-04-22 PROCEDURE — 99213 OFFICE O/P EST LOW 20 MIN: CPT | Performed by: NURSE PRACTITIONER

## 2025-04-22 ASSESSMENT — PATIENT HEALTH QUESTIONNAIRE - PHQ9
SUM OF ALL RESPONSES TO PHQ QUESTIONS 1-9: 0
2. FEELING DOWN, DEPRESSED OR HOPELESS: NOT AT ALL
SUM OF ALL RESPONSES TO PHQ QUESTIONS 1-9: 0
1. LITTLE INTEREST OR PLEASURE IN DOING THINGS: NOT AT ALL
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
## 2025-05-06 ENCOUNTER — OFFICE VISIT (OUTPATIENT)
Dept: PAIN MANAGEMENT | Age: 50
End: 2025-05-06
Payer: MEDICAID

## 2025-05-06 VITALS — HEIGHT: 72 IN | BODY MASS INDEX: 27.36 KG/M2 | WEIGHT: 202 LBS

## 2025-05-06 DIAGNOSIS — M47.812 CERVICAL SPONDYLOSIS: ICD-10-CM

## 2025-05-06 DIAGNOSIS — M48.02 CERVICAL SPINAL STENOSIS: ICD-10-CM

## 2025-05-06 DIAGNOSIS — M54.51 VERTEBROGENIC LOW BACK PAIN: ICD-10-CM

## 2025-05-06 DIAGNOSIS — M47.817 LUMBOSACRAL SPONDYLOSIS WITHOUT MYELOPATHY: Primary | ICD-10-CM

## 2025-05-06 PROCEDURE — 99214 OFFICE O/P EST MOD 30 MIN: CPT | Performed by: ANESTHESIOLOGY

## 2025-05-06 ASSESSMENT — ENCOUNTER SYMPTOMS
RESPIRATORY NEGATIVE: 1
GASTROINTESTINAL NEGATIVE: 1

## 2025-05-06 NOTE — PROGRESS NOTES
The patient is a 49 y.o.Non- / non  male.    Chief Complaint   Patient presents with    Neck Pain    Follow Up After Procedure     Cervical epidural steroid injection under fluoroscopy guidance      49-year-old gentleman with history of chronic neck and low back pain  Neck pain located in the axial cervical spine with intermittent radiation down left arm  Intermittent left arm numbness and paresthesia  Had recent cervical epidural injection but did not find it much helpful    Doing physical therapy and planning to follow-up with neurosurgery relating to cervical spine issues    Back pain  Index pain today is back pain  Predominantly axial going for 10+ years located in the lumbar area across midline on both side no significant dermatomal radiation describes it as aching nagging stiffness aggravates with routine activity  Imaging did not show any surgical pathology  Clinical presentation suggest facet mediated pain  Patient failed NSAIDs and therapy  Oswestry disability index show moderate disability associated with pain  Discussed diagnostic bilateral lumbar medial branch nerve block at L3-4 and L4-5 facet with fluoroscopy guidance  If this provide 80% plus relief with improved range of motion, we will then consider for confirmatory block and radiofrequency ablation          Outcome   Any improvement of activity?  No   Any side effects (appetite,leg cramping,facial fleshing): No   Increase of pain:  No  Pain score Today:  6  % of pain relief: 0%  Pain diary (medial branch block):     No results found for: \"LABA1C\"         Past Medical History:   Diagnosis Date    Arthritis     Caffeine use     1 tea, 2 soda/day    Chronic back pain     Erectile dysfunction         Past Surgical History:   Procedure Laterality Date    QUADRACEPS TENDON REPAIR Left 10/06/2023    QUADRICEPS REPAIR Right 2012    VASECTOMY Bilateral 12/21/2016       Social History     Socioeconomic History    Marital status: Single

## 2025-05-27 DIAGNOSIS — Z76.0 ENCOUNTER FOR ISSUE OF REPEAT PRESCRIPTION: ICD-10-CM

## 2025-05-27 DIAGNOSIS — G89.29 CHRONIC PAIN OF RIGHT KNEE: ICD-10-CM

## 2025-05-27 DIAGNOSIS — M25.361 KNEE INSTABILITY, RIGHT: ICD-10-CM
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
## 2025-05-27 DIAGNOSIS — M25.561 CHRONIC PAIN OF RIGHT KNEE: ICD-10-CM

## 2025-05-27 RX ORDER — MULTIVIT-MIN/IRON/FOLIC ACID/K 18-600-40
2000 CAPSULE ORAL DAILY
Qty: 30 CAPSULE | Refills: 5 | Status: SHIPPED | OUTPATIENT
Start: 2025-05-27

## 2025-05-27 RX ORDER — TADALAFIL 20 MG/1
20 TABLET ORAL DAILY PRN
Qty: 20 TABLET | Refills: 2 | Status: SHIPPED | OUTPATIENT
Start: 2025-05-27 | End: 2026-05-27

## 2025-05-27 RX ORDER — LIDOCAINE 50 MG/G
OINTMENT TOPICAL PRN
Qty: 50 G | Refills: 0 | Status: SHIPPED | OUTPATIENT
Start: 2025-05-27

## 2025-05-27 RX ORDER — ACETAMINOPHEN 500 MG
1000 TABLET ORAL EVERY 6 HOURS PRN
Qty: 120 TABLET | Refills: 1 | Status: SHIPPED | OUTPATIENT
Start: 2025-05-27

## 2025-05-27 NOTE — TELEPHONE ENCOUNTER
Pt called into the office. States that he switched from [facility] to [facility] pharmacies but [facility] will not transfer medications.     Asking for refills on meds to [facility]. Pended.     States [facility] so we do not have to fill that one.     Lov 4/22/25

## 2025-06-13 DIAGNOSIS — Z76.0 ENCOUNTER FOR ISSUE OF REPEAT PRESCRIPTION: ICD-10-CM

## 2025-06-13 RX ORDER — TADALAFIL 20 MG/1
20 TABLET ORAL DAILY PRN
Qty: 20 TABLET | Refills: 0 | Status: SHIPPED | OUTPATIENT
Start: 2025-06-13

## 2025-06-21 DIAGNOSIS — M25.361 KNEE INSTABILITY, RIGHT: ICD-10-CM
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
## 2025-06-23 RX ORDER — LIDOCAINE 50 MG/G
OINTMENT TOPICAL
Qty: 50 G | Refills: 0 | Status: SHIPPED | OUTPATIENT
Start: 2025-06-23

## 2025-07-08 DIAGNOSIS — Z76.0 ENCOUNTER FOR ISSUE OF REPEAT PRESCRIPTION: ICD-10-CM

## 2025-07-08 RX ORDER — TADALAFIL 20 MG/1
20 TABLET ORAL DAILY PRN
Qty: 20 TABLET | Refills: 2 | Status: SHIPPED | OUTPATIENT
Start: 2025-07-08
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
## 2025-07-20 DIAGNOSIS — M25.361 KNEE INSTABILITY, RIGHT: ICD-10-CM

## 2025-07-21 RX ORDER — LIDOCAINE 50 MG/G
OINTMENT TOPICAL
Qty: 50 G | Refills: 0 | Status: SHIPPED | OUTPATIENT
Start: 2025-07-21

## 2025-07-28 DIAGNOSIS — Z76.0 ENCOUNTER FOR ISSUE OF REPEAT PRESCRIPTION: ICD-10-CM

## 2025-07-29 RX ORDER — TADALAFIL 20 MG/1
TABLET ORAL
Qty: 20 TABLET | Refills: 0 | Status: SHIPPED | OUTPATIENT
Start: 2025-07-29

## 2025-08-26 ENCOUNTER — TELEPHONE (OUTPATIENT)
Dept: PRIMARY CARE CLINIC | Age: 50
End: 2025-08-26

## 2025-08-26 DIAGNOSIS — M25.361 KNEE INSTABILITY, RIGHT: ICD-10-CM
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
## 2025-08-26 RX ORDER — LIDOCAINE 50 MG/G
OINTMENT TOPICAL
Qty: 50 G | Refills: 0 | Status: SHIPPED | OUTPATIENT
Start: 2025-08-26